# Patient Record
Sex: FEMALE | Race: WHITE | NOT HISPANIC OR LATINO | Employment: FULL TIME | URBAN - NONMETROPOLITAN AREA
[De-identification: names, ages, dates, MRNs, and addresses within clinical notes are randomized per-mention and may not be internally consistent; named-entity substitution may affect disease eponyms.]

---

## 2017-01-05 ENCOUNTER — ROUTINE PRENATAL (OUTPATIENT)
Dept: OBSTETRICS AND GYNECOLOGY | Facility: CLINIC | Age: 37
End: 2017-01-05

## 2017-01-05 VITALS — BODY MASS INDEX: 24.82 KG/M2 | DIASTOLIC BLOOD PRESSURE: 70 MMHG | WEIGHT: 173 LBS | SYSTOLIC BLOOD PRESSURE: 110 MMHG

## 2017-01-05 DIAGNOSIS — Z36.9 ENCOUNTER FOR ANTENATAL SCREENING: ICD-10-CM

## 2017-01-05 DIAGNOSIS — Z3A.28 28 WEEKS GESTATION OF PREGNANCY: ICD-10-CM

## 2017-01-05 DIAGNOSIS — O99.843 PREGNANCY COMPLICATED BY PREVIOUS GASTRIC BYPASS, ANTEPARTUM, THIRD TRIMESTER: Primary | ICD-10-CM

## 2017-01-05 PROBLEM — O99.840 PREGNANCY COMPLICATED BY PREVIOUS GASTRIC BYPASS, ANTEPARTUM: Status: ACTIVE | Noted: 2017-01-05

## 2017-01-05 PROCEDURE — 0502F SUBSEQUENT PRENATAL CARE: CPT | Performed by: OBSTETRICS & GYNECOLOGY

## 2017-01-05 RX ORDER — LANOLIN ALCOHOL/MO/W.PET/CERES
800 CREAM (GRAM) TOPICAL DAILY
COMMUNITY
End: 2017-04-03

## 2017-01-05 NOTE — PROGRESS NOTES
Chief Complaint   Patient presents with   • Routine Prenatal Visit       ROS  Vaginal bleeding: No   Nausea: No   Diarrhea: No   Constipation: No   Other:      Lab Results   Component Value Date    ABO A 08/08/2016    RH POS 08/08/2016       Specific topics discussed at today's visit: none - she had no major complaints,questions or concerns  Tests done today: GC/Chlamydia testing  GCT  and a vaginosis panel

## 2017-01-05 NOTE — MR AVS SNAPSHOT
Anayeli Ledesma Gina   2017 9:00 AM   Routine Prenatal    Dept Phone:  [phone]   Encounter #:  [id number]    Provider:  Mino Bansal MD   Department:  [facility]                Your Full Care Plan              Today's Medication Changes          These changes are accurate as of: 17  9:29 AM.  If you have any questions, ask your nurse or doctor.               Stop taking medication(s)listed here:     cetirizine 10 MG tablet   Commonly known as:  zyrTEC           dicyclomine 20 MG tablet   Commonly known as:  BENTYL           HYDROcodone-acetaminophen 7.5-325 MG per tablet   Commonly known as:  NORCO           montelukast 10 MG tablet   Commonly known as:  SINGULAIR           omeprazole 40 MG capsule   Commonly known as:  priLOSEC                      Your Updated Medication List          This list is accurate as of: 17  9:29 AM.  Always use your most recent med list.                ferrous sulfate 325 (65 FE) MG tablet       folic acid 400 MCG tablet   Commonly known as:  FOLVITE       MULTIVITAMIN PO       PROAIR  (90 BASE) MCG/ACT inhaler   Generic drug:  albuterol               We Performed the Following     CBC Auto Differential     Chlamydia / GC, DNA Probe     Glucose, Post 50 Gm Glucola     Vaginitis / Vaginosis DNA Probe       You Were Diagnosed With        Codes Comments    Encounter for  screening    -  Primary ICD-10-CM: Z36  ICD-9-CM: V28.9     Encounter for supervision of normal first pregnancy in third trimester     ICD-10-CM: Z34.03  ICD-9-CM: V22.0       Instructions     None    Patient Instructions History      Upcoming Appointments     Visit Type Date Time Department    OB FOLLOWUP 2017  9:00 AM [facility]    OB FOLLOWUP 2017 11:00 AM [facility]      MyChart Signup     Our records indicate that you have declined [facility]. If you would like to sign up for [facility], please  email [email or web address] or call [phone] to obtain an activation code.             Other Info from Your Visit           Your Appointments     2017 11:00 AM CST   OB FOLLOWUP with Mino Bansal MD   [facility] (--)    [address] [place] [address] [address]  [facility] [address] [place] [id number] [phone]              Allergies     Nsaids  Shortness Of Breath    Septra [Sulfamethoxazole-trimethoprim]      *childhood Rx    Zinacef [Cefuroxime In Sterile Water]      Childhood Rx      Vital Signs     Blood Pressure Weight Last Menstrual Period Body Mass Index Smoking Status       110/70 173 lb (78.5 kg) 2016 (Exact Date) 24.82 kg/m2 Never Smoker       Problems and Diagnoses Noted     Encounter for  screening    -  Primary    Prenatal care

## 2017-01-19 ENCOUNTER — ROUTINE PRENATAL (OUTPATIENT)
Dept: OBSTETRICS AND GYNECOLOGY | Facility: CLINIC | Age: 37
End: 2017-01-19

## 2017-01-19 VITALS — SYSTOLIC BLOOD PRESSURE: 118 MMHG | DIASTOLIC BLOOD PRESSURE: 72 MMHG | BODY MASS INDEX: 25.11 KG/M2 | WEIGHT: 175 LBS

## 2017-01-19 DIAGNOSIS — Z3A.30 30 WEEKS GESTATION OF PREGNANCY: ICD-10-CM

## 2017-01-19 DIAGNOSIS — O99.843 PREGNANCY COMPLICATED BY PREVIOUS GASTRIC BYPASS, ANTEPARTUM, THIRD TRIMESTER: Primary | ICD-10-CM

## 2017-01-19 PROCEDURE — 0502F SUBSEQUENT PRENATAL CARE: CPT | Performed by: OBSTETRICS & GYNECOLOGY

## 2017-01-19 NOTE — MR AVS SNAPSHOT
Anayeli Ledesma Gina   1/19/2017 9:15 AM   Routine Prenatal    Dept Phone:  [phone]   Encounter #:  [id number]    Provider:  Mino Bansal MD   Department:  [facility]                Your Full Care Plan              Your Updated Medication List          This list is accurate as of: 1/19/17 11:18 AM.  Always use your most recent med list.                ferrous sulfate 325 (65 FE) MG tablet       folic acid 400 MCG tablet   Commonly known as:  FOLVITE       MULTIVITAMIN PO       PROAIR  (90 BASE) MCG/ACT inhaler   Generic drug:  albuterol               Instructions     None    Patient Instructions History      Upcoming Appointments     Visit Type Date Time Department    OB FOLLOWUP 1/19/2017  9:15 AM [facility]    OB FOLLOWUP 2/3/2017  9:00 AM [facility]      MyChart Signup     Our records indicate that you have declined [facility]. If you would like to sign up for [facility], please email [email or web address] or call [phone] to obtain an activation code.             Other Info from Your Visit           Your Appointments     Feb 03, 2017  9:00 AM CST   OB FOLLOWUP with Mino Bansal MD   [facility] (--)    [address] [place] [address] Dr  [facility] [address] [place] [id number] [phone]              Allergies     Nsaids  Shortness Of Breath    Septra [Sulfamethoxazole-trimethoprim]      *childhood Rx    Zinacef [Cefuroxime In Sterile Water]      Childhood Rx      Reason for Visit     Routine Prenatal Visit           Vital Signs     Blood Pressure Weight Last Menstrual Period Body Mass Index Smoking Status       118/72 175 lb (79.4 kg) 06/20/2016 (Exact Date) 25.11 kg/m2 Never Smoker

## 2017-01-19 NOTE — PROGRESS NOTES
Chief Complaint   Patient presents with   • Routine Prenatal Visit       ROS  Headache: No   Visual changes: No   Swelling in legs: No   Nausea: No   Constipation: No   Diarrhea: No   Contractions: No   Leaking fluid: No   Vaginal bleeding: No   Other:      Specific topics discussed at today's visit: none - she had no major complaints,questions or concerns  Tests done today: none, her GCT was nl

## 2017-02-03 ENCOUNTER — ROUTINE PRENATAL (OUTPATIENT)
Dept: OBSTETRICS AND GYNECOLOGY | Facility: CLINIC | Age: 37
End: 2017-02-03

## 2017-02-03 VITALS — BODY MASS INDEX: 26.11 KG/M2 | SYSTOLIC BLOOD PRESSURE: 116 MMHG | DIASTOLIC BLOOD PRESSURE: 64 MMHG | WEIGHT: 182 LBS

## 2017-02-03 DIAGNOSIS — Z3A.32 32 WEEKS GESTATION OF PREGNANCY: ICD-10-CM

## 2017-02-03 DIAGNOSIS — O99.843 PREGNANCY COMPLICATED BY PREVIOUS GASTRIC BYPASS, ANTEPARTUM, THIRD TRIMESTER: Primary | ICD-10-CM

## 2017-02-03 PROCEDURE — 0502F SUBSEQUENT PRENATAL CARE: CPT | Performed by: OBSTETRICS & GYNECOLOGY

## 2017-02-16 ENCOUNTER — ROUTINE PRENATAL (OUTPATIENT)
Dept: OBSTETRICS AND GYNECOLOGY | Facility: CLINIC | Age: 37
End: 2017-02-16

## 2017-02-16 VITALS — DIASTOLIC BLOOD PRESSURE: 72 MMHG | SYSTOLIC BLOOD PRESSURE: 110 MMHG | WEIGHT: 183 LBS | BODY MASS INDEX: 26.26 KG/M2

## 2017-02-16 DIAGNOSIS — O99.843 PREGNANCY COMPLICATED BY PREVIOUS GASTRIC BYPASS, ANTEPARTUM, THIRD TRIMESTER: Primary | ICD-10-CM

## 2017-02-16 DIAGNOSIS — Z3A.34 34 WEEKS GESTATION OF PREGNANCY: ICD-10-CM

## 2017-02-16 PROCEDURE — 0502F SUBSEQUENT PRENATAL CARE: CPT | Performed by: OBSTETRICS & GYNECOLOGY

## 2017-02-16 NOTE — PROGRESS NOTES
Chief Complaint   Patient presents with   • Routine Prenatal Visit       No complaints    ROS  Headache: No   Visual changes: No   Swelling in legs: No   Nausea: No   Constipation: No   Diarrhea: No   Contractions: No   Leaking fluid: No   Vaginal bleeding: No   Other:      Specific topics discussed at today's visit: none - she had no major complaints,questions or concerns  Tests done today: none  Tests to be done at the next visit:GBS testing

## 2017-03-03 ENCOUNTER — ROUTINE PRENATAL (OUTPATIENT)
Dept: OBSTETRICS AND GYNECOLOGY | Facility: CLINIC | Age: 37
End: 2017-03-03

## 2017-03-03 VITALS — BODY MASS INDEX: 26.54 KG/M2 | SYSTOLIC BLOOD PRESSURE: 106 MMHG | WEIGHT: 185 LBS | DIASTOLIC BLOOD PRESSURE: 80 MMHG

## 2017-03-03 DIAGNOSIS — Z36.85 ANTENATAL SCREENING FOR STREPTOCOCCUS B: Primary | ICD-10-CM

## 2017-03-03 DIAGNOSIS — O99.843 PREGNANCY COMPLICATED BY PREVIOUS GASTRIC BYPASS, ANTEPARTUM, THIRD TRIMESTER: ICD-10-CM

## 2017-03-03 DIAGNOSIS — Z3A.36 36 WEEKS GESTATION OF PREGNANCY: ICD-10-CM

## 2017-03-03 PROCEDURE — 87653 STREP B DNA AMP PROBE: CPT | Performed by: OBSTETRICS & GYNECOLOGY

## 2017-03-03 PROCEDURE — 0502F SUBSEQUENT PRENATAL CARE: CPT | Performed by: OBSTETRICS & GYNECOLOGY

## 2017-03-03 NOTE — PROGRESS NOTES
Chief Complaint   Patient presents with   • Routine Prenatal Visit       No complaints    ROS  Headache: No   Visual changes: No   Swelling in legs: No   Nausea: No   Constipation: No   Diarrhea: No   Contractions: No   Leaking fluid: No   Vaginal bleeding: No   Other:      Specific topics discussed at today's visit: none - she had no major complaints,questions or concerns  Tests done today: GBS testing  Tests to be done at the next visit:none     Bedside ultrasound demonstrates the baby still in breech presentation

## 2017-03-04 LAB — GROUP B STREP, DNA: NEGATIVE

## 2017-03-10 ENCOUNTER — ROUTINE PRENATAL (OUTPATIENT)
Dept: OBSTETRICS AND GYNECOLOGY | Facility: CLINIC | Age: 37
End: 2017-03-10

## 2017-03-10 VITALS — SYSTOLIC BLOOD PRESSURE: 108 MMHG | WEIGHT: 186 LBS | DIASTOLIC BLOOD PRESSURE: 78 MMHG | BODY MASS INDEX: 26.69 KG/M2

## 2017-03-10 DIAGNOSIS — Z3A.37 37 WEEKS GESTATION OF PREGNANCY: ICD-10-CM

## 2017-03-10 DIAGNOSIS — O99.843 PREGNANCY COMPLICATED BY PREVIOUS GASTRIC BYPASS, ANTEPARTUM, THIRD TRIMESTER: ICD-10-CM

## 2017-03-10 PROCEDURE — 0502F SUBSEQUENT PRENATAL CARE: CPT | Performed by: OBSTETRICS & GYNECOLOGY

## 2017-03-10 NOTE — PROGRESS NOTES
Chief Complaint   Patient presents with   • Routine Prenatal Visit       No complaints    ROS  Headache: No   Visual changes: No   Swelling in legs: No   Nausea: No   Constipation: No   Diarrhea: No   Contractions: No   Leaking fluid: No   Vaginal bleeding: No   Other:      Specific topics discussed at today's visit: none - she had no major complaints,questions or concerns  Tests done today: none  Tests to be done at the next visit:none     Bedside ultrasound shows the fetus is still breech.  It does appear to be an adequate amount of fluid around the baby as well

## 2017-03-16 ENCOUNTER — ANESTHESIA (OUTPATIENT)
Dept: LABOR AND DELIVERY | Facility: HOSPITAL | Age: 37
End: 2017-03-16

## 2017-03-16 ENCOUNTER — ANESTHESIA EVENT (OUTPATIENT)
Dept: LABOR AND DELIVERY | Facility: HOSPITAL | Age: 37
End: 2017-03-16

## 2017-03-16 ENCOUNTER — HOSPITAL ENCOUNTER (INPATIENT)
Facility: HOSPITAL | Age: 37
LOS: 2 days | Discharge: HOME | End: 2017-03-18
Attending: OBSTETRICS & GYNECOLOGY | Admitting: OBSTETRICS & GYNECOLOGY

## 2017-03-16 ENCOUNTER — ROUTINE PRENATAL (OUTPATIENT)
Dept: OBSTETRICS AND GYNECOLOGY | Facility: CLINIC | Age: 37
End: 2017-03-16

## 2017-03-16 VITALS — BODY MASS INDEX: 26.98 KG/M2 | SYSTOLIC BLOOD PRESSURE: 90 MMHG | WEIGHT: 188 LBS | DIASTOLIC BLOOD PRESSURE: 70 MMHG

## 2017-03-16 DIAGNOSIS — O99.843 PREGNANCY COMPLICATED BY PREVIOUS GASTRIC BYPASS, ANTEPARTUM, THIRD TRIMESTER: ICD-10-CM

## 2017-03-16 DIAGNOSIS — Z3A.38 38 WEEKS GESTATION OF PREGNANCY: ICD-10-CM

## 2017-03-16 LAB
ABO GROUP BLD: NORMAL
AMPHET+METHAMPHET UR QL: NEGATIVE
BARBITURATES UR QL SCN: NEGATIVE
BENZODIAZ UR QL SCN: NEGATIVE
BLD GP AB SCN SERPL QL: NEGATIVE
CANNABINOIDS SERPL QL: NEGATIVE
COCAINE UR QL: NEGATIVE
DEPRECATED RDW RBC AUTO: 46.3 FL (ref 36.4–46.3)
ERYTHROCYTE [DISTWIDTH] IN BLOOD BY AUTOMATED COUNT: 17 % (ref 11.5–14.5)
HCT VFR BLD AUTO: 30.8 % (ref 35–45)
HGB BLD-MCNC: 9.3 G/DL (ref 12–15.5)
Lab: NORMAL
MCH RBC QN AUTO: 22.9 PG (ref 26.5–34)
MCHC RBC AUTO-ENTMCNC: 30.2 G/DL (ref 31.4–36)
MCV RBC AUTO: 75.7 FL (ref 80–98)
METHADONE UR QL SCN: NEGATIVE
OPIATES UR QL: NEGATIVE
OXYCODONE UR QL SCN: NEGATIVE
PLATELET # BLD AUTO: 371 10*3/MM3 (ref 150–450)
PMV BLD AUTO: 9.9 FL (ref 8–12)
RBC # BLD AUTO: 4.07 10*6/MM3 (ref 3.77–5.16)
RH BLD: POSITIVE
WBC NRBC COR # BLD: 11.02 10*3/MM3 (ref 3.2–9.8)

## 2017-03-16 PROCEDURE — 80307 DRUG TEST PRSMV CHEM ANLYZR: CPT | Performed by: OBSTETRICS & GYNECOLOGY

## 2017-03-16 PROCEDURE — 86900 BLOOD TYPING SEROLOGIC ABO: CPT | Performed by: OBSTETRICS & GYNECOLOGY

## 2017-03-16 PROCEDURE — 51703 INSERT BLADDER CATH COMPLEX: CPT

## 2017-03-16 PROCEDURE — 94799 UNLISTED PULMONARY SVC/PX: CPT

## 2017-03-16 PROCEDURE — 86901 BLOOD TYPING SEROLOGIC RH(D): CPT | Performed by: OBSTETRICS & GYNECOLOGY

## 2017-03-16 PROCEDURE — 59510 CESAREAN DELIVERY: CPT | Performed by: OBSTETRICS & GYNECOLOGY

## 2017-03-16 PROCEDURE — 25010000002 MORPHINE PER 10 MG: Performed by: NURSE ANESTHETIST, CERTIFIED REGISTERED

## 2017-03-16 PROCEDURE — 86850 RBC ANTIBODY SCREEN: CPT | Performed by: OBSTETRICS & GYNECOLOGY

## 2017-03-16 PROCEDURE — 59514 CESAREAN DELIVERY ONLY: CPT | Performed by: SPECIALIST/TECHNOLOGIST, OTHER

## 2017-03-16 PROCEDURE — 25010000002 PHENYLEPHRINE PER 1 ML: Performed by: NURSE ANESTHETIST, CERTIFIED REGISTERED

## 2017-03-16 PROCEDURE — 25010000002 GENTAMICIN PER 80 MG: Performed by: OBSTETRICS & GYNECOLOGY

## 2017-03-16 PROCEDURE — 25010000002 DIPHENHYDRAMINE PER 50 MG

## 2017-03-16 PROCEDURE — 85027 COMPLETE CBC AUTOMATED: CPT | Performed by: OBSTETRICS & GYNECOLOGY

## 2017-03-16 PROCEDURE — 25010000002 ONDANSETRON PER 1 MG: Performed by: NURSE ANESTHETIST, CERTIFIED REGISTERED

## 2017-03-16 RX ORDER — CARBOPROST TROMETHAMINE 250 UG/ML
250 INJECTION, SOLUTION INTRAMUSCULAR AS NEEDED
Status: DISCONTINUED | OUTPATIENT
Start: 2017-03-16 | End: 2017-03-16 | Stop reason: HOSPADM

## 2017-03-16 RX ORDER — ALBUTEROL SULFATE 90 UG/1
2 AEROSOL, METERED RESPIRATORY (INHALATION)
Status: DISCONTINUED | OUTPATIENT
Start: 2017-03-16 | End: 2017-03-16

## 2017-03-16 RX ORDER — LIDOCAINE HYDROCHLORIDE 10 MG/ML
5 INJECTION, SOLUTION INFILTRATION; PERINEURAL AS NEEDED
Status: DISCONTINUED | OUTPATIENT
Start: 2017-03-16 | End: 2017-03-16 | Stop reason: HOSPADM

## 2017-03-16 RX ORDER — FOLIC ACID 1 MG/1
1 TABLET ORAL DAILY
Status: DISCONTINUED | OUTPATIENT
Start: 2017-03-16 | End: 2017-03-16

## 2017-03-16 RX ORDER — MORPHINE SULFATE 1 MG/ML
INJECTION, SOLUTION EPIDURAL; INTRATHECAL; INTRAVENOUS AS NEEDED
Status: DISCONTINUED | OUTPATIENT
Start: 2017-03-16 | End: 2017-03-16 | Stop reason: SURG

## 2017-03-16 RX ORDER — OXYTOCIN/RINGER'S LACTATE 20/1000 ML
999 PLASTIC BAG, INJECTION (ML) INTRAVENOUS ONCE
Status: CANCELLED | OUTPATIENT
Start: 2017-03-16 | End: 2017-03-16

## 2017-03-16 RX ORDER — SODIUM CHLORIDE, SODIUM LACTATE, POTASSIUM CHLORIDE, CALCIUM CHLORIDE 600; 310; 30; 20 MG/100ML; MG/100ML; MG/100ML; MG/100ML
125 INJECTION, SOLUTION INTRAVENOUS CONTINUOUS
Status: DISCONTINUED | OUTPATIENT
Start: 2017-03-16 | End: 2017-03-18 | Stop reason: HOSPADM

## 2017-03-16 RX ORDER — OXYTOCIN/RINGER'S LACTATE 20/1000 ML
999 PLASTIC BAG, INJECTION (ML) INTRAVENOUS ONCE
Status: DISCONTINUED | OUTPATIENT
Start: 2017-03-16 | End: 2017-03-16 | Stop reason: HOSPADM

## 2017-03-16 RX ORDER — DIPHENHYDRAMINE HYDROCHLORIDE 50 MG/ML
25 INJECTION INTRAMUSCULAR; INTRAVENOUS EVERY 4 HOURS PRN
Status: DISCONTINUED | OUTPATIENT
Start: 2017-03-16 | End: 2017-03-18 | Stop reason: HOSPADM

## 2017-03-16 RX ORDER — SODIUM CHLORIDE 0.9 % (FLUSH) 0.9 %
1-10 SYRINGE (ML) INJECTION AS NEEDED
Status: CANCELLED | OUTPATIENT
Start: 2017-03-16

## 2017-03-16 RX ORDER — OXYCODONE AND ACETAMINOPHEN 10; 325 MG/1; MG/1
1 TABLET ORAL EVERY 4 HOURS PRN
Status: DISCONTINUED | OUTPATIENT
Start: 2017-03-17 | End: 2017-03-18 | Stop reason: HOSPADM

## 2017-03-16 RX ORDER — METHYLERGONOVINE MALEATE 0.2 MG/ML
200 INJECTION INTRAVENOUS AS NEEDED
Status: DISCONTINUED | OUTPATIENT
Start: 2017-03-16 | End: 2017-03-16 | Stop reason: HOSPADM

## 2017-03-16 RX ORDER — CLINDAMYCIN PHOSPHATE 900 MG/50ML
900 INJECTION INTRAVENOUS ONCE
Status: COMPLETED | OUTPATIENT
Start: 2017-03-16 | End: 2017-03-16

## 2017-03-16 RX ORDER — DOCUSATE SODIUM 100 MG/1
100 CAPSULE, LIQUID FILLED ORAL 2 TIMES DAILY PRN
Status: DISCONTINUED | OUTPATIENT
Start: 2017-03-16 | End: 2017-03-18 | Stop reason: HOSPADM

## 2017-03-16 RX ORDER — DIPHENHYDRAMINE HYDROCHLORIDE 50 MG/ML
INJECTION INTRAMUSCULAR; INTRAVENOUS
Status: COMPLETED
Start: 2017-03-16 | End: 2017-03-16

## 2017-03-16 RX ORDER — ONDANSETRON 2 MG/ML
INJECTION INTRAMUSCULAR; INTRAVENOUS AS NEEDED
Status: DISCONTINUED | OUTPATIENT
Start: 2017-03-16 | End: 2017-03-16 | Stop reason: SURG

## 2017-03-16 RX ORDER — HYDROXYZINE HYDROCHLORIDE 25 MG/1
50 TABLET, FILM COATED ORAL EVERY 6 HOURS PRN
Status: DISCONTINUED | OUTPATIENT
Start: 2017-03-16 | End: 2017-03-18 | Stop reason: HOSPADM

## 2017-03-16 RX ORDER — SODIUM CHLORIDE 0.9 % (FLUSH) 0.9 %
1-10 SYRINGE (ML) INJECTION AS NEEDED
Status: DISCONTINUED | OUTPATIENT
Start: 2017-03-16 | End: 2017-03-16 | Stop reason: HOSPADM

## 2017-03-16 RX ORDER — PRENATAL VIT/IRON FUM/FOLIC AC 27MG-0.8MG
1 TABLET ORAL DAILY
Status: DISCONTINUED | OUTPATIENT
Start: 2017-03-16 | End: 2017-03-18 | Stop reason: HOSPADM

## 2017-03-16 RX ORDER — ALBUTEROL SULFATE 90 UG/1
2 AEROSOL, METERED RESPIRATORY (INHALATION) EVERY 4 HOURS PRN
Status: DISCONTINUED | OUTPATIENT
Start: 2017-03-16 | End: 2017-03-18 | Stop reason: HOSPADM

## 2017-03-16 RX ORDER — CALCIUM CARBONATE 200(500)MG
2 TABLET,CHEWABLE ORAL EVERY 6 HOURS PRN
Status: DISCONTINUED | OUTPATIENT
Start: 2017-03-16 | End: 2017-03-18 | Stop reason: HOSPADM

## 2017-03-16 RX ORDER — DIPHENHYDRAMINE HCL 25 MG
25 CAPSULE ORAL EVERY 4 HOURS PRN
Status: DISCONTINUED | OUTPATIENT
Start: 2017-03-16 | End: 2017-03-18 | Stop reason: HOSPADM

## 2017-03-16 RX ORDER — SODIUM CHLORIDE, SODIUM LACTATE, POTASSIUM CHLORIDE, CALCIUM CHLORIDE 600; 310; 30; 20 MG/100ML; MG/100ML; MG/100ML; MG/100ML
INJECTION, SOLUTION INTRAVENOUS
Status: COMPLETED
Start: 2017-03-16 | End: 2017-03-16

## 2017-03-16 RX ORDER — OXYTOCIN/RINGER'S LACTATE 20/1000 ML
125 PLASTIC BAG, INJECTION (ML) INTRAVENOUS AS NEEDED
Status: CANCELLED | OUTPATIENT
Start: 2017-03-16 | End: 2017-03-17

## 2017-03-16 RX ORDER — ACETAMINOPHEN 325 MG/1
650 TABLET ORAL EVERY 4 HOURS PRN
Status: ACTIVE | OUTPATIENT
Start: 2017-03-16 | End: 2017-03-17

## 2017-03-16 RX ORDER — ONDANSETRON 4 MG/1
4 TABLET, FILM COATED ORAL EVERY 8 HOURS PRN
Status: DISCONTINUED | OUTPATIENT
Start: 2017-03-16 | End: 2017-03-18 | Stop reason: HOSPADM

## 2017-03-16 RX ORDER — LANOLIN 100 %
1 OINTMENT (GRAM) TOPICAL
Status: DISCONTINUED | OUTPATIENT
Start: 2017-03-16 | End: 2017-03-18 | Stop reason: HOSPADM

## 2017-03-16 RX ORDER — OXYTOCIN/RINGER'S LACTATE 20/1000 ML
PLASTIC BAG, INJECTION (ML) INTRAVENOUS
Status: COMPLETED
Start: 2017-03-16 | End: 2017-03-16

## 2017-03-16 RX ORDER — BUPIVACAINE HYDROCHLORIDE 7.5 MG/ML
INJECTION, SOLUTION EPIDURAL; RETROBULBAR AS NEEDED
Status: DISCONTINUED | OUTPATIENT
Start: 2017-03-16 | End: 2017-03-16 | Stop reason: SURG

## 2017-03-16 RX ORDER — OXYCODONE HYDROCHLORIDE AND ACETAMINOPHEN 5; 325 MG/1; MG/1
1 TABLET ORAL EVERY 4 HOURS PRN
Status: DISCONTINUED | OUTPATIENT
Start: 2017-03-17 | End: 2017-03-18 | Stop reason: HOSPADM

## 2017-03-16 RX ORDER — NALOXONE HCL 0.4 MG/ML
0.2 VIAL (ML) INJECTION
Status: DISCONTINUED | OUTPATIENT
Start: 2017-03-16 | End: 2017-03-18 | Stop reason: HOSPADM

## 2017-03-16 RX ORDER — LIDOCAINE HYDROCHLORIDE 10 MG/ML
5 INJECTION, SOLUTION INFILTRATION; PERINEURAL AS NEEDED
Status: CANCELLED | OUTPATIENT
Start: 2017-03-16

## 2017-03-16 RX ORDER — PROMETHAZINE HYDROCHLORIDE 25 MG/ML
12.5 INJECTION, SOLUTION INTRAMUSCULAR; INTRAVENOUS EVERY 6 HOURS PRN
Status: DISCONTINUED | OUTPATIENT
Start: 2017-03-16 | End: 2017-03-18 | Stop reason: HOSPADM

## 2017-03-16 RX ORDER — OXYTOCIN/RINGER'S LACTATE 20/1000 ML
1000 PLASTIC BAG, INJECTION (ML) INTRAVENOUS CONTINUOUS
Status: ACTIVE | OUTPATIENT
Start: 2017-03-16 | End: 2017-03-16

## 2017-03-16 RX ORDER — MISOPROSTOL 100 UG/1
800 TABLET ORAL AS NEEDED
Status: CANCELLED | OUTPATIENT
Start: 2017-03-16

## 2017-03-16 RX ORDER — LANOLIN 100 %
OINTMENT (GRAM) TOPICAL
Status: COMPLETED
Start: 2017-03-16 | End: 2017-03-16

## 2017-03-16 RX ORDER — SODIUM CHLORIDE, SODIUM LACTATE, POTASSIUM CHLORIDE, CALCIUM CHLORIDE 600; 310; 30; 20 MG/100ML; MG/100ML; MG/100ML; MG/100ML
125 INJECTION, SOLUTION INTRAVENOUS CONTINUOUS
Status: CANCELLED | OUTPATIENT
Start: 2017-03-16

## 2017-03-16 RX ORDER — MISOPROSTOL 200 UG/1
800 TABLET ORAL AS NEEDED
Status: DISCONTINUED | OUTPATIENT
Start: 2017-03-16 | End: 2017-03-16 | Stop reason: HOSPADM

## 2017-03-16 RX ORDER — METHYLERGONOVINE MALEATE 0.2 MG/ML
200 INJECTION INTRAVENOUS AS NEEDED
Status: CANCELLED | OUTPATIENT
Start: 2017-03-16

## 2017-03-16 RX ORDER — SODIUM CHLORIDE, SODIUM LACTATE, POTASSIUM CHLORIDE, CALCIUM CHLORIDE 600; 310; 30; 20 MG/100ML; MG/100ML; MG/100ML; MG/100ML
INJECTION, SOLUTION INTRAVENOUS
Status: DISPENSED
Start: 2017-03-16 | End: 2017-03-16

## 2017-03-16 RX ORDER — ONDANSETRON 2 MG/ML
4 INJECTION INTRAMUSCULAR; INTRAVENOUS ONCE AS NEEDED
Status: ACTIVE | OUTPATIENT
Start: 2017-03-16 | End: 2017-03-17

## 2017-03-16 RX ORDER — ZOLPIDEM TARTRATE 5 MG/1
5 TABLET ORAL NIGHTLY PRN
Status: DISCONTINUED | OUTPATIENT
Start: 2017-03-16 | End: 2017-03-18 | Stop reason: HOSPADM

## 2017-03-16 RX ORDER — CARBOPROST TROMETHAMINE 250 UG/ML
250 INJECTION, SOLUTION INTRAMUSCULAR AS NEEDED
Status: CANCELLED | OUTPATIENT
Start: 2017-03-16

## 2017-03-16 RX ORDER — LANOLIN ALCOHOL/MO/W.PET/CERES
800 CREAM (GRAM) TOPICAL DAILY
Status: DISCONTINUED | OUTPATIENT
Start: 2017-03-16 | End: 2017-03-16

## 2017-03-16 RX ORDER — OXYTOCIN/RINGER'S LACTATE 20/1000 ML
125 PLASTIC BAG, INJECTION (ML) INTRAVENOUS AS NEEDED
Status: DISCONTINUED | OUTPATIENT
Start: 2017-03-16 | End: 2017-03-16 | Stop reason: HOSPADM

## 2017-03-16 RX ADMIN — SODIUM CHLORIDE, POTASSIUM CHLORIDE, SODIUM LACTATE AND CALCIUM CHLORIDE 1000 ML: 600; 310; 30; 20 INJECTION, SOLUTION INTRAVENOUS at 11:47

## 2017-03-16 RX ADMIN — Medication 200 ML: at 12:50

## 2017-03-16 RX ADMIN — PHENYLEPHRINE HYDROCHLORIDE 100 MCG: 10 INJECTION INTRAVENOUS at 12:29

## 2017-03-16 RX ADMIN — MORPHINE SULFATE 0.2 MG: 1 INJECTION, SOLUTION EPIDURAL; INTRATHECAL; INTRAVENOUS at 12:21

## 2017-03-16 RX ADMIN — SODIUM CHLORIDE, POTASSIUM CHLORIDE, SODIUM LACTATE AND CALCIUM CHLORIDE: 600; 310; 30; 20 INJECTION, SOLUTION INTRAVENOUS at 12:25

## 2017-03-16 RX ADMIN — SODIUM CHLORIDE, SODIUM LACTATE, POTASSIUM CHLORIDE, AND CALCIUM CHLORIDE 300 ML: .6; .31; .03; .02 INJECTION, SOLUTION INTRAVENOUS at 12:25

## 2017-03-16 RX ADMIN — Medication: at 15:00

## 2017-03-16 RX ADMIN — Medication 125 ML/HR: at 14:34

## 2017-03-16 RX ADMIN — Medication 400 ML: at 13:05

## 2017-03-16 RX ADMIN — GENTAMICIN SULFATE 120 MG: 40 INJECTION, SOLUTION INTRAMUSCULAR; INTRAVENOUS at 12:29

## 2017-03-16 RX ADMIN — SODIUM CHLORIDE, SODIUM LACTATE, POTASSIUM CHLORIDE, AND CALCIUM CHLORIDE 1000 ML: .6; .31; .03; .02 INJECTION, SOLUTION INTRAVENOUS at 11:47

## 2017-03-16 RX ADMIN — CLINDAMYCIN IN 5 PERCENT DEXTROSE 900 MG: 18 INJECTION, SOLUTION INTRAVENOUS at 12:21

## 2017-03-16 RX ADMIN — Medication 100 ML: at 12:39

## 2017-03-16 RX ADMIN — SODIUM CITRATE AND CITRIC ACID MONOHYDRATE 30 ML: 500; 334 SOLUTION ORAL at 12:00

## 2017-03-16 RX ADMIN — ONDANSETRON 8 MG: 2 INJECTION INTRAMUSCULAR; INTRAVENOUS at 12:10

## 2017-03-16 RX ADMIN — BUPIVACAINE HYDROCHLORIDE 2 ML: 7.5 INJECTION, SOLUTION EPIDURAL; RETROBULBAR at 12:21

## 2017-03-16 RX ADMIN — SODIUM CHLORIDE, SODIUM LACTATE, POTASSIUM CHLORIDE, AND CALCIUM CHLORIDE 700 ML: .6; .31; .03; .02 INJECTION, SOLUTION INTRAVENOUS at 12:14

## 2017-03-16 RX ADMIN — DIPHENHYDRAMINE HYDROCHLORIDE 25 MG: 50 INJECTION INTRAMUSCULAR; INTRAVENOUS at 15:46

## 2017-03-16 NOTE — ANESTHESIA PROCEDURE NOTES
Spinal Block    Patient location during procedure: OB  Indication:at surgeon's request and procedure for pain  Performed By  CRNA: AKANKSHA SAUNDERS  Preanesthetic Checklist  Completed: patient identified, site marked, surgical consent, pre-op evaluation, timeout performed, IV checked, risks and benefits discussed and monitors and equipment checked  Spinal Block Prep:  Patient Position:sitting  Sterile Tech:cap, gloves, mask and sterile barriers  Prep:DuraPrep  Patient Monitoring:blood pressure monitoring, continuous pulse oximetry and EKG  Spinal Block Procedure  Approach:midline  Guidance:landmark technique  Location:L3-L4  Needle Type:Sprotte  Needle Gauge:24 G  Placement of Spinal needle event:cerebrospinal fluid aspirated    Fluid Appearance:clear  Post Assessment  Patient Tolerance:patient tolerated the procedure well with no apparent complications  Complications no

## 2017-03-16 NOTE — ANESTHESIA PREPROCEDURE EVALUATION
Anesthesia Evaluation     Patient summary reviewed and Nursing notes reviewed   NPO Status: > 2 hours   Airway   Mallampati: I  TM distance: >3 FB  Neck ROM: full  no difficulty expected  Dental - normal exam     Pulmonary - normal exam   (+) asthma (as a child, ),   Cardiovascular - negative cardio ROS and normal exam        Neuro/Psych- negative ROS  GI/Hepatic/Renal/Endo    (+)  chronic renal disease stones,     Musculoskeletal (-) negative ROS    Abdominal    Substance History - negative use     OB/GYN    (+) Pregnant,     Comment: Breech presentation      Other - negative ROS                                   Anesthesia Plan    ASA 2 - emergent     spinal     intravenous induction   Anesthetic plan and risks discussed with patient.

## 2017-03-16 NOTE — BRIEF OP NOTE
SECTION PRIMARY  Procedure Note    Anayeli Fuentes  3/16/2017    Pre-op Diagnosis:   IUP at 38+3  Breech  Advanced cervical dilation     Post-op Diagnosis:     same    Procedure/CPT® Codes:      Procedure(s):   SECTION PRIMARY    Surgeon(s):  Mino Bansal MD    Anesthesia: Spinal    Staff:   Circulator: Maryanne Og RN  Scrub Person: Anegla Kumari  L & YESENIA Nurse: Elida Woods RN    Estimated Blood Loss: 500 mL  Urine Voided: 100 mL    Specimens:                * No specimens in log *      Drains:           Findings: nl appearing female, apgars 8+9     Complications: none      Mino Bansal MD     Date: 3/16/2017  Time: 12:58 PM

## 2017-03-16 NOTE — ANESTHESIA POSTPROCEDURE EVALUATION
Patient: Anayeli Fuentes    Procedure Summary     Date Anesthesia Start Anesthesia Stop Room / Location    17 1213 1322 [facility] LABOR DELIVERY       Procedure Diagnosis Surgeon Provider     SECTION PRIMARY (N/A Abdomen) No diagnosis on file. MD Lanette Singh CRNA          Anesthesia Type: spinal  Last vitals  BP      Temp      Pulse     Resp      SpO2        Post Anesthesia Care and Evaluation    Patient location during evaluation: bedside  Patient participation: complete - patient participated  Level of consciousness: awake and awake and alert  Pain score: 0  Pain management: satisfactory to patient  Airway patency: patent  Anesthetic complications: No anesthetic complications  PONV Status: none  Cardiovascular status: acceptable and stable  Respiratory status: acceptable, room air, unassisted and spontaneous ventilation  Hydration status: acceptable  Post Neuraxial Block status: Motor and sensory function returned to baseline, No signs or symptoms of PDPH and Spinal starting to receed

## 2017-03-16 NOTE — PLAN OF CARE
Problem: Patient Care Overview (Adult)  Goal: Plan of Care Review  Outcome: Ongoing (interventions implemented as appropriate)    17 4340   Coping/Psychosocial Response Interventions   Plan Of Care Reviewed With patient;spouse   Patient Care Overview   Progress improving   Outcome Evaluation   Outcome Summary/Follow up Plan Pt had duramorph, pain is well controlled, breastfeeding without difficulty, stood at bedside and tolerated well, no problems noted.       Goal: Adult Individualization and Mutuality  Outcome: Ongoing (interventions implemented as appropriate)  Goal: Discharge Needs Assessment  Outcome: Outcome(s) achieved Date Met:  17    Problem: Anesthesia/Analgesia, Neuraxial (Obstetrics)  Goal: Signs and Symptoms of Listed Potential Problems Will be Absent or Manageable (Anesthesia/Analgesia, Neuraxial)  Outcome: Ongoing (interventions implemented as appropriate)    Problem: Fall Risk  (Adult,Obstetrics,Pediatric)  Goal: Identify Related Risk Factors and Signs and Symptoms  Outcome: Ongoing (interventions implemented as appropriate)  Goal: Absence of Maternal Fall  Outcome: Ongoing (interventions implemented as appropriate)  Goal: Absence of Protivin Fall/Drop  Outcome: Ongoing (interventions implemented as appropriate)    Problem: Postpartum ( Delivery) (Adult)  Goal: Signs and Symptoms of Listed Potential Problems Will be Absent or Manageable (Postpartum)  Outcome: Ongoing (interventions implemented as appropriate)    Problem: Breastfeeding (Adult,NICU,,Obstetrics,Pediatric)  Goal: Signs and Symptoms of Listed Potential Problems Will be Absent or Manageable (Breastfeeding)  Outcome: Ongoing (interventions implemented as appropriate)

## 2017-03-16 NOTE — H&P
Anayeli Fuentes  : 1980  MRN: [id number]  CSN: [id number]    History and Physical    Anayeli Fuentes is a 36 y.o. year old  with an Estimated Date of Delivery: 3/27/17 presenting for  delivery due to breech presentation and 4 cm dilated.  She has a known posterior placenta.  She is not planning for sterilization at the time of the .    Her prenatal care has been benign.    Obstetric History       T0      TAB0   SAB0   E0   M0   L0       # Outcome Date GA Lbr Luciano/2nd Weight Sex Delivery Anes PTL Lv   1 Current                 Past Medical History   Diagnosis Date   • Abdominal pain    • Abnormal Pap smear of cervix    • Asthma    • Cervical dysplasia    • Chronic abdominal pain      nonspecific   • Chronic cholecystitis with calculus    • Diabetes mellitus screening    • Encounter for removal of intrauterine contraceptive device    • Iron deficiency anemia    • Kidney stone    • Nausea    • Pelvic congestion syndrome    • Right upper quadrant pain    • Screening for deficiency anemia    • Screening for hyperlipidemia    • Thyroid disorder screening      Past Surgical History   Procedure Laterality Date   • Cholecystectomy with intraoperative cholangiogram  2014   • Gastric banding     • Gastric bypass     • Gastric banding         Current Outpatient Prescriptions:   •  albuterol (PROAIR HFA) 108 (90 BASE) MCG/ACT inhaler, Inhale 2 puffs., Disp: , Rfl:   •  Fe Heme Polypeptide-Folic Acid (PROFERRIN-FORTE) 12-1 MG tablet, Take 1 tablet by mouth 3 (Three) Times a Day., Disp: 90 each, Rfl: 10  •  folic acid (FOLVITE) 400 MCG tablet, Take 800 mcg by mouth Daily., Disp: , Rfl:   •  Multiple Vitamins-Minerals (MULTIVITAMIN PO), Take 1 tablet by mouth daily., Disp: , Rfl:     Allergies   Allergen Reactions   • Nsaids Shortness Of Breath   • Septra [Sulfamethoxazole-Trimethoprim]      *childhood Rx   • Zinacef [Cefuroxime In Sterile Water]      Childhood  Rx     Smoking status: Never Smoker                                                              Smokeless status: Never Used                        Review of Systems    Visit Vitals   • BP 90/70   • Wt 188 lb (85.3 kg)   • LMP 2016 (Exact Date)   • BMI 26.98 kg/m2     General: well developed; well nourished  no acute distress   Heart: regular rate and rhythm   Lungs: breathing is unlabored  clear to auscultation bilaterally   Abdomen: soft, non-tender; no masses  no umbilical or inginual hernias are present  no hepato-splenomegaly  Gravid, cervix is 4 cm 80% effaced, and footling     Prenatal Labs  Lab Results   Component Value Date    HEPBSAG Negative 2016    ABO A 2016    RH POS 2016       Recent Labs  Lab Results   Component Value Date    HGB 9.7 (L) 2017    HCT 31.6 (L) 2017    WBC 10.8 (H) 2017     2017        Assessment   1. IUP with an Estimated Date of Delivery: 3/27/17  2. Planned  section due to malpresentation: Footling, and early labor  3.      Plan   Primary  The risks, benefits. and alternatives to  section were discussed.  The risks that were discussed included, but were not limited to, pain, infection, bleeding, hemorrhage; including need for transfusion to which she would have no objection; injury to the bowel, bladder, uterus, tubes, ovaries, or baby which could require further surgery or prolonged hospitalization.  The patient understands that we'll have leg pumps on her legs to try and reduce the risk of clot formation her legs.  She understands that we will have early ambulation to also reduce the risk of blood clot formation and to reduce the risk of pneumonia.  She will be given antibiotics prior to her surgery to help decrease the risk for infection.  All questions were answered.          This document has been electronically signed by Mino Bansal MD on 2017 10:34 AM

## 2017-03-16 NOTE — PLAN OF CARE
Problem: Patient Care Overview (Adult)  Goal: Plan of Care Review  Outcome: Ongoing (interventions implemented as appropriate)    17 1715   Coping/Psychosocial Response Interventions   Plan Of Care Reviewed With patient;spouse;family   Patient Care Overview   Progress improving       Goal: Adult Individualization and Mutuality  Outcome: Ongoing (interventions implemented as appropriate)  Goal: Discharge Needs Assessment  Outcome: Ongoing (interventions implemented as appropriate)    Problem:  Delivery (Adult,Obstetrics,Pediatric)  Goal: Signs and Symptoms of Listed Potential Problems Will be Absent or Manageable ( Delivery)  Outcome: Ongoing (interventions implemented as appropriate)    Problem: Anesthesia/Analgesia, Neuraxial (Obstetrics)  Goal: Signs and Symptoms of Listed Potential Problems Will be Absent or Manageable (Anesthesia/Analgesia, Neuraxial)  Outcome: Ongoing (interventions implemented as appropriate)    Problem: Fall Risk  (Adult,Obstetrics,Pediatric)  Goal: Identify Related Risk Factors and Signs and Symptoms  Outcome: Ongoing (interventions implemented as appropriate)  Goal: Absence of Maternal Fall  Outcome: Ongoing (interventions implemented as appropriate)  Goal: Absence of  Fall/Drop  Outcome: Ongoing (interventions implemented as appropriate)

## 2017-03-17 LAB
ANISOCYTOSIS BLD QL: NORMAL
BASOPHILS # BLD AUTO: 0.06 10*3/MM3 (ref 0–0.2)
BASOPHILS NFR BLD AUTO: 0.4 % (ref 0–2)
DEPRECATED RDW RBC AUTO: 46.8 FL (ref 36.4–46.3)
EOSINOPHIL # BLD AUTO: 0.15 10*3/MM3 (ref 0–0.7)
EOSINOPHIL NFR BLD AUTO: 1 % (ref 0–7)
ERYTHROCYTE [DISTWIDTH] IN BLOOD BY AUTOMATED COUNT: 17 % (ref 11.5–14.5)
HCT VFR BLD AUTO: 24.6 % (ref 35–45)
HGB BLD-MCNC: 7.5 G/DL (ref 12–15.5)
HYPOCHROMIA BLD QL: NORMAL
IMM GRANULOCYTES # BLD: 0.04 10*3/MM3 (ref 0–0.02)
IMM GRANULOCYTES NFR BLD: 0.3 % (ref 0–0.5)
LYMPHOCYTES # BLD AUTO: 2.79 10*3/MM3 (ref 0.6–4.2)
LYMPHOCYTES NFR BLD AUTO: 19.3 % (ref 10–50)
MCH RBC QN AUTO: 23.1 PG (ref 26.5–34)
MCHC RBC AUTO-ENTMCNC: 30.5 G/DL (ref 31.4–36)
MCV RBC AUTO: 75.9 FL (ref 80–98)
MICROCYTES BLD QL: NORMAL
MONOCYTES # BLD AUTO: 1.01 10*3/MM3 (ref 0–0.9)
MONOCYTES NFR BLD AUTO: 7 % (ref 0–12)
NEUTROPHILS # BLD AUTO: 10.42 10*3/MM3 (ref 2–8.6)
NEUTROPHILS NFR BLD AUTO: 72 % (ref 37–80)
PLATELET # BLD AUTO: 311 10*3/MM3 (ref 150–450)
PMV BLD AUTO: 10.4 FL (ref 8–12)
RBC # BLD AUTO: 3.24 10*6/MM3 (ref 3.77–5.16)
SMALL PLATELETS BLD QL SMEAR: ADEQUATE
WBC MORPH BLD: NORMAL
WBC NRBC COR # BLD: 14.47 10*3/MM3 (ref 3.2–9.8)

## 2017-03-17 PROCEDURE — 85007 BL SMEAR W/DIFF WBC COUNT: CPT | Performed by: OBSTETRICS & GYNECOLOGY

## 2017-03-17 PROCEDURE — 85025 COMPLETE CBC W/AUTO DIFF WBC: CPT | Performed by: OBSTETRICS & GYNECOLOGY

## 2017-03-17 RX ORDER — FERROUS SULFATE TAB EC 324 MG (65 MG FE EQUIVALENT) 324 (65 FE) MG
324 TABLET DELAYED RESPONSE ORAL 3 TIMES DAILY
Status: DISCONTINUED | OUTPATIENT
Start: 2017-03-17 | End: 2017-03-18 | Stop reason: HOSPADM

## 2017-03-17 RX ORDER — SIMETHICONE 80 MG
80 TABLET,CHEWABLE ORAL 4 TIMES DAILY PRN
Status: DISCONTINUED | OUTPATIENT
Start: 2017-03-17 | End: 2017-03-18 | Stop reason: HOSPADM

## 2017-03-17 RX ORDER — FOLIC ACID 1 MG/1
1 TABLET ORAL 3 TIMES DAILY
Status: DISCONTINUED | OUTPATIENT
Start: 2017-03-17 | End: 2017-03-18 | Stop reason: HOSPADM

## 2017-03-17 RX ADMIN — OXYCODONE HYDROCHLORIDE AND ACETAMINOPHEN 1 TABLET: 5; 325 TABLET ORAL at 12:09

## 2017-03-17 RX ADMIN — FERROUS SULFATE TAB EC 324 MG (65 MG FE EQUIVALENT) 324 MG: 324 (65 FE) TABLET DELAYED RESPONSE at 20:08

## 2017-03-17 RX ADMIN — FERROUS SULFATE TAB EC 324 MG (65 MG FE EQUIVALENT) 324 MG: 324 (65 FE) TABLET DELAYED RESPONSE at 17:23

## 2017-03-17 RX ADMIN — FOLIC ACID 1 MG: 1 TABLET ORAL at 17:23

## 2017-03-17 RX ADMIN — SIMETHICONE CHEW TAB 80 MG 80 MG: 80 TABLET ORAL at 17:24

## 2017-03-17 RX ADMIN — OXYCODONE HYDROCHLORIDE AND ACETAMINOPHEN 1 TABLET: 5; 325 TABLET ORAL at 16:40

## 2017-03-17 RX ADMIN — OXYCODONE HYDROCHLORIDE AND ACETAMINOPHEN 1 TABLET: 5; 325 TABLET ORAL at 07:25

## 2017-03-17 RX ADMIN — PRENATAL VIT W/ FE FUMARATE-FA TAB 27-0.8 MG 1 TABLET: 27-0.8 TAB at 08:51

## 2017-03-17 RX ADMIN — FOLIC ACID 1 MG: 1 TABLET ORAL at 08:51

## 2017-03-17 RX ADMIN — MEASLES, MUMPS, AND RUBELLA VIRUS VACCINE LIVE 0.5 ML: 1000; 12500; 1000 INJECTION, POWDER, LYOPHILIZED, FOR SUSPENSION SUBCUTANEOUS at 20:09

## 2017-03-17 RX ADMIN — OXYCODONE HYDROCHLORIDE AND ACETAMINOPHEN 1 TABLET: 5; 325 TABLET ORAL at 03:09

## 2017-03-17 RX ADMIN — OXYCODONE HYDROCHLORIDE AND ACETAMINOPHEN 1 TABLET: 5; 325 TABLET ORAL at 22:20

## 2017-03-17 RX ADMIN — FERROUS SULFATE TAB EC 324 MG (65 MG FE EQUIVALENT) 324 MG: 324 (65 FE) TABLET DELAYED RESPONSE at 08:51

## 2017-03-17 RX ADMIN — FOLIC ACID 1 MG: 1 TABLET ORAL at 20:08

## 2017-03-17 NOTE — PLAN OF CARE
Problem: Breastfeeding (Adult,NICU,[place],Obstetrics,Pediatric)  Goal: Signs and Symptoms of Listed Potential Problems Will be Absent or Manageable (Breastfeeding)  Outcome: Ongoing (interventions implemented as appropriate)

## 2017-03-17 NOTE — PLAN OF CARE
Problem: Patient Care Overview (Adult)  Goal: Plan of Care Review  Outcome: Ongoing (interventions implemented as appropriate)    17 4128   Coping/Psychosocial Response Interventions   Plan Of Care Reviewed With patient   Patient Care Overview   Progress improving   Outcome Evaluation   Outcome Summary/Follow up Plan Pain controlled with prn percocet, ambulating well in halls, voiding, vitals stable, lochia is scant, denies any issues.       Goal: Adult Individualization and Mutuality  Outcome: Ongoing (interventions implemented as appropriate)    Problem: Postpartum ( Delivery) (Adult)  Goal: Signs and Symptoms of Listed Potential Problems Will be Absent or Manageable (Postpartum)  Outcome: Ongoing (interventions implemented as appropriate)    Problem: Breastfeeding (Adult,NICU,[place],Obstetrics,Pediatric)  Goal: Signs and Symptoms of Listed Potential Problems Will be Absent or Manageable (Breastfeeding)  Outcome: Ongoing (interventions implemented as appropriate)

## 2017-03-17 NOTE — PROGRESS NOTES
[facility]  Anayeli Fuentes  : 1980  MRN: [id number]  CSN: [id number]    Postpartum Day #1  Subjective   Anayeli Fuentes was seen and examined this morning in NAD. Pt states pain is well controlled on current medications. (+)Ambulation, [place] WNL, (-)BM and (-)Flatus. Pt is breast feeding. Patient is currently undecided on birth control option.     Objective     Min/max vitals past 24 hours:   Temp  Min: 97.2 °F (36.2 °C)  Max: 99 °F (37.2 °C)  BP  Min: 90/70  Max: 137/73  Pulse  Min: 57  Max: 82  Pulse  Min: 57  Max: 82                        Gen: A&Ox3, NAD          HEENT: NC/AT, PERRL, EOMI, no facial asymetry          CV: RRR, S1 S2 apreciated, no murmurs, rubs, gallops          Respiratory: CTAB, bilateral symetrical chest rise.  Abdomen: soft, not tender; normal bowel sounds; no masses, Fundus firm and below the umbilicus, Incision C/D/I   Pelvic: Deferred         Extremities: No edema, No erythema    Lab Results   Component Value Date    WBC 11.02 (H) 2017    HGB 9.3 (L) 2017    HCT 30.8 (L) 2017    MCV 75.7 (L) 2017     2017    RH Positive 2017    HEPBSAG Negative 2016        Assessment  Anayeli Fuentes is a 36 y.o. year old  s/p Primary Low  Transverse CS secondary to breech presentation day 1  1. Postpartum Day 1 S/P Primary Low  Transverse CS     Plan   1. Continue routine post op care - encourage ambulation  2. Pt is breast feeding  3. Patient is currently undecided on birth control option.             This document has been electronically signed by Pierre Mireles MD on 2017 5:59 AM

## 2017-03-17 NOTE — PROGRESS NOTES
[facility]  Anayeli Fuentes  : 1980  MRN: [id number]  CSN: [id number]    Postpartum Day #1  Subjective   Anayeli Fuentes was seen and examined this afternoon in NAD. Pt states pain is well controlled on current medications. (+)Ambulation, [place] WNL, (-)BM and (-)Flatus. Pt is breast feeding. Patient is currently undecided on birth control option.     Objective     Min/max vitals past 24 hours:   Temp  Min: 97.6 °F (36.4 °C)  Max: 99 °F (37.2 °C)  BP  Min: 107/60  Max: 139/75  Pulse  Min: 59  Max: 86  Pulse  Min: 59  Max: 86                        Gen: A&Ox3, NAD          HEENT: NC/AT, PERRL, EOMI, no facial asymetry          CV: RRR, S1 S2 apreciated, no murmurs, rubs, gallops          Respiratory: CTAB, bilateral symetrical chest rise.  Abdomen: soft, not tender; normal bowel sounds; no masses, Fundus firm and below the umbilicus, Incision C/D/I   Pelvic: Deferred         Extremities: No edema, No erythema    Lab Results   Component Value Date    WBC 14.47 (H) 2017    HGB 7.5 (L) 2017    HCT 24.6 (L) 2017    MCV 75.9 (L) 2017     2017    RH Positive 2017    HEPBSAG Negative 2016        Assessment  Anayeli Fuentes is a 36 y.o. year old  s/p Primary Low Transverse CS secondary to breech presentation day 1  1. Postpartum Day 1 S/P Primary Low  Transverse CS     Plan   1. Continue routine post op care - encourage ambulation  2. Pt is breast feeding  3. Patient is currently undecided on birth control option.             This document has been electronically signed by Pierre Mireles MD on 2017 2:55 PM

## 2017-03-17 NOTE — PLAN OF CARE
Problem: Patient Care Overview (Adult)  Goal: Plan of Care Review  Outcome: Ongoing (interventions implemented as appropriate)    17 0524   Coping/Psychosocial Response Interventions   Plan Of Care Reviewed With patient   Patient Care Overview   Progress improving   Outcome Evaluation   Outcome Summary/Follow up Plan pain controlled with PRN medication, ambulating in room, voids, VSS, lochia light       Goal: Adult Individualization and Mutuality  Outcome: Ongoing (interventions implemented as appropriate)    Problem: Postpartum ( Delivery) (Adult)  Goal: Signs and Symptoms of Listed Potential Problems Will be Absent or Manageable (Postpartum)  Outcome: Ongoing (interventions implemented as appropriate)    Problem: Breastfeeding (Adult,NICU,,Obstetrics,Pediatric)  Goal: Signs and Symptoms of Listed Potential Problems Will be Absent or Manageable (Breastfeeding)  Outcome: Ongoing (interventions implemented as appropriate)

## 2017-03-18 VITALS
HEIGHT: 70 IN | WEIGHT: 188 LBS | SYSTOLIC BLOOD PRESSURE: 109 MMHG | HEART RATE: 82 BPM | DIASTOLIC BLOOD PRESSURE: 73 MMHG | RESPIRATION RATE: 18 BRPM | TEMPERATURE: 99.1 F | BODY MASS INDEX: 26.92 KG/M2 | OXYGEN SATURATION: 99 %

## 2017-03-18 RX ORDER — OXYCODONE HYDROCHLORIDE AND ACETAMINOPHEN 5; 325 MG/1; MG/1
1-2 TABLET ORAL EVERY 4 HOURS PRN
Qty: 40 TABLET | Refills: 0 | Status: SHIPPED | OUTPATIENT
Start: 2017-03-18 | End: 2017-03-26

## 2017-03-18 RX ADMIN — OXYCODONE HYDROCHLORIDE AND ACETAMINOPHEN 1 TABLET: 5; 325 TABLET ORAL at 11:40

## 2017-03-18 RX ADMIN — OXYCODONE HYDROCHLORIDE AND ACETAMINOPHEN 1 TABLET: 5; 325 TABLET ORAL at 02:57

## 2017-03-18 RX ADMIN — FOLIC ACID 1 MG: 1 TABLET ORAL at 08:46

## 2017-03-18 RX ADMIN — PRENATAL VIT W/ FE FUMARATE-FA TAB 27-0.8 MG 1 TABLET: 27-0.8 TAB at 08:46

## 2017-03-18 RX ADMIN — FERROUS SULFATE TAB EC 324 MG (65 MG FE EQUIVALENT) 324 MG: 324 (65 FE) TABLET DELAYED RESPONSE at 08:46

## 2017-03-18 NOTE — PLAN OF CARE
Problem: Patient Care Overview (Adult)  Goal: Plan of Care Review  Outcome: Ongoing (interventions implemented as appropriate)  Goal: Adult Individualization and Mutuality  Outcome: Ongoing (interventions implemented as appropriate)    Problem: Postpartum ( Delivery) (Adult)  Goal: Signs and Symptoms of Listed Potential Problems Will be Absent or Manageable (Postpartum)  Outcome: Ongoing (interventions implemented as appropriate)    Problem: Breastfeeding (Adult,NICU,[place],Obstetrics,Pediatric)  Goal: Signs and Symptoms of Listed Potential Problems Will be Absent or Manageable (Breastfeeding)  Outcome: Outcome(s) achieved Date Met:  17

## 2017-03-18 NOTE — DISCHARGE SUMMARY
Alireza Fuentes  : 1980  MRN: [id number]  CSN: [id number]    Discharge Summary      Date of Admission: 3/16/2017   Date of Discharge:    Principle Discharge Dx: 1. IUP at 38+3, breech presentation   Procedures Performed: Procedure(s):   SECTION PRIMARY   Brief History: Patient is a 36 y.o. now  who presented to labor and delivery due to malpresentation with footling presentation.   Hospital Course: Her post-operative course was unremarkable.  On POD # 2 she expressed the desire for D/C. .  She had no febrile morbidity. She had normal bowel and bladder function and was hemodynamically stable.  Her wound was healing well without obvious signs of infections.   Pending Studies: none   Discharge Medications:    Your medication list      START taking these medications       Instructions Last Dose Given Next Dose Due    oxyCODONE-acetaminophen 5-325 MG per tablet   Commonly known as:  PERCOCET        Take 1-2 tablets by mouth Every 4 (Four) Hours As Needed (pain) for up to 8 days.           CONTINUE taking these medications       Instructions Last Dose Given Next Dose Due    folic acid 400 MCG tablet   Commonly known as:  FOLVITE              MULTIVITAMIN PO              PROAIR  (90 BASE) MCG/ACT inhaler   Generic drug:  albuterol              PROFERRIN-FORTE 12-1 MG tablet   Generic drug:  Fe Heme Polypeptide-Folic Acid        Take 1 tablet by mouth 3 (Three) Times a Day.              Where to Get Your Medications      You can get these medications from any pharmacy     Bring a paper prescription for each of these medications    • oxyCODONE-acetaminophen 5-325 MG per tablet            Discharge Disposition: home   Follow-up: No future appointments.       This note has been electronically signed.    Mino Bansal MD  2017

## 2017-03-18 NOTE — PROGRESS NOTES
[facility]  Anayeli Fuentes  : 1980  MRN: [id number]  CSN: [id number]    Postpartum Day #2  Subjective   Anayeli Fuentes was seen and examined this morning in NAD. Pt states pain is well controlled on current medications. (+)Ambulation, [place] WNL, (-)BM and (+)Flatus. Pt is breast feeding. Patient is currently undecided on birth control option.     Objective     Min/max vitals past 24 hours:   Temp  Min: 97.6 °F (36.4 °C)  Max: 98.6 °F (37 °C)  BP  Min: 102/60  Max: 139/75  Pulse  Min: 75  Max: 89  Pulse  Min: 75  Max: 89                        Gen: A&Ox3, NAD          HEENT: NC/AT, PERRL, EOMI, no facial asymetry          CV: RRR, S1 S2 apreciated, no murmurs, rubs, gallops          Respiratory: CTAB, bilateral symetrical chest rise.  Abdomen: soft, not tender; normal bowel sounds; no masses, Fundus firm and below the umbilicus, Incision C/D/I   Pelvic: Deferred         Extremities: No edema, No erythema    Lab Results   Component Value Date    WBC 14.47 (H) 2017    HGB 7.5 (L) 2017    HCT 24.6 (L) 2017    MCV 75.9 (L) 2017     2017    RH Positive 2017    HEPBSAG Negative 2016        Assessment  Anayeli Fuentes is a 36 y.o. year old  s/p Primary Low Transverse CS secondary to breech presentation day 2  1. Postpartum Day 2 S/P Primary Low  Transverse CS     Plan   1. Continue routine post op care - encourage ambulation  2. Pt is breast feeding  3. Patient is currently undecided on birth control option.             This document has been electronically signed by Pierre Mireles MD on 2017 7:07 AM

## 2017-03-21 NOTE — OP NOTE
DATE OF PROCEDURE: 2017    PREOPERATIVE DIAGNOSES:    1.   Intrauterine pregnancy at 38-3/7 weeks.   2.   Footling breech presentation with advanced cervical dilation.      POSTOPERATIVE DIAGNOSES:     1.   Intrauterine pregnancy at 38-3/7 weeks.   2.   Footling breech presentation with advanced cervical dilation.      PROCEDURE PERFORMED:  Primary  section.     SURGEON:  Mino Bansal MD     ASSISTANT: Anayeli Christensen CSA     ANESTHESIA:  Spinal.      ESTIMATED BLOOD LOSS:   500 mL.      COMPLICATIONS:  None.     COUNTS:  Correct.      FINDINGS:  Normal-appearing female infant.  Weight pending at time of dictation with Apgar scores of 8 at one minute and 9 at five minutes.  Normal-appearing Lerma placenta with 2 arteries and 1 vein.     DESCRIPTION OF PROCEDURE:  After informed consent was obtained, after the consent as signed and in order, the patient was brought to the operating room, placed on the operating room table in the left lateral tilt, positioned in preparation for her  section.  After an adequate level of spinal anesthesia was noted, the patient was routinely prepped, had her bladder drained of urine and draped in the usual sterile manner.  A timeout was performed and incision was then made in the skin with the knife through the patient’s prior incision.  The incision was extended down to the level of the fascia.  The fascia was nicked in the midline.  The incision extended bilaterally.  Two Kocher clamps were then used to grasp the upper edges of the underlying muscles dissected off the overlying fascia superiorly.   The midline between rectus muscles were identified and entered sharply.  The peritoneum was then entered sharply.  This was then bluntly extended.  Bladder blade was placed into position.  Small incision was scored introducing puncture the uterine cavity.  Clear fluid was noted.  This was then bluntly extended in a transverse fashion.  The left leg was then  brought up followed by the right leg and the remaining part of the infant was delivered up to the level of the shoulders.  Each arm was then swept across the chest and delivered and the head then easily followed.  There was a nuchal cord x1, which was then reduced.  The cord was doubly clamped and cut.   The infant was passed off.  The cord blood was obtained.  The placenta was spontaneously delivered.  The uterus was then exteriorized and wiped out with a moist lap pad.  The incision was then closed using a 0 Vicryl in a running locking fashion followed a second imbricating layer of 0 Vicryl.  There was 1 area of bleeding which was oversewn using 0 Vicryl in an interrupted figure-of-eight fashion.  At this point, the uterus was replaced into the abdominal cavity.  The abdominal cavity was copiously irrigated and suctioned.  Reinspection of the incision revealed that it remained hemostatic.  The peritoneum was closed using 2-0 Vicryl.  The muscle layer was inspected and appeared hemostatic.  The fascia was closed using 0 PDS.  The subcutaneous layer was irrigated and suctioned.  All bleeding areas were noted as cauterized.  The skin was closed using a 3-0 Monocryl.  A sterile pressure dressing was applied.  The patient and infant tolerated the procedure well and will go to the recovery room in satisfactory condition.             CC:            Mino Bansal MD  Dictation Date/Time: 03/16/2017 14:07:03(Eastern Time Zone)  Transcribed Date/Time: 03/16/2017 14:57:02 (Eastern Time Zone)  Dictator/ Initials:  RIO/shona  Document ID:                [id number]  Job ID: [id number]  (dx/proc)

## 2017-04-03 ENCOUNTER — OFFICE VISIT (OUTPATIENT)
Dept: OBSTETRICS AND GYNECOLOGY | Facility: CLINIC | Age: 37
End: 2017-04-03

## 2017-04-03 DIAGNOSIS — Z98.891 STATUS POST CESAREAN SECTION ROUTINE POSTPARTUM FOLLOW-UP: Primary | ICD-10-CM

## 2017-04-03 PROCEDURE — 0503F POSTPARTUM CARE VISIT: CPT | Performed by: OBSTETRICS & GYNECOLOGY

## 2017-04-03 RX ORDER — FERROUS SULFATE 325(65) MG
325 TABLET ORAL
COMMUNITY
End: 2020-05-07

## 2017-04-03 NOTE — PROGRESS NOTES
Subjective   Chief Complaint   Patient presents with   • Post-op     Anayeli Fuentes is a 36 y.o. year old  presenting for a postpartum visit.  She had a Primary  (LTCS).   Prenatal course was been complicated by malpresentation (breech).    Since delivery she has not been sexually active.  She does not have concerns about post-partum blues/depression.   She is breast feeding    The following portions of the patient's history were reviewed and updated as appropriate:current medications, allergies and past surgical history    Smoking status: Never Smoker                                                              Smokeless status: Never Used                        Review of Systems      Objective   LMP 2016 (Exact Date)  Breastfeeding? Yes    General:  well developed; well nourished  no acute distress   Abdomen: soft, non-tender; no masses  no umbilical or inginual hernias are present  no hepato-splenomegaly  incision is clean, dry and intact   Pelvis: Not performed.          Assessment   1. Normal 2 week postpartum exam  2. Thinking about birth control, is considering Nexplanon versus a Mirena     Plan   1. Follow-up in 4 weeks for her 6 week postpartum exam         This note was electronically signed.    Mino Bansal MD  April 3, 2017

## 2017-05-04 ENCOUNTER — PROCEDURE VISIT (OUTPATIENT)
Dept: OBSTETRICS AND GYNECOLOGY | Facility: CLINIC | Age: 37
End: 2017-05-04

## 2017-05-04 DIAGNOSIS — Z98.891 STATUS POST CESAREAN SECTION ROUTINE POSTPARTUM FOLLOW-UP: Primary | ICD-10-CM

## 2017-05-04 DIAGNOSIS — Z30.430 ENCOUNTER FOR IUD INSERTION: ICD-10-CM

## 2017-05-04 PROCEDURE — 0503F POSTPARTUM CARE VISIT: CPT | Performed by: OBSTETRICS & GYNECOLOGY

## 2017-05-04 PROCEDURE — 88142 CYTOPATH C/V THIN LAYER: CPT | Performed by: OBSTETRICS & GYNECOLOGY

## 2017-05-04 PROCEDURE — 58300 INSERT INTRAUTERINE DEVICE: CPT | Performed by: OBSTETRICS & GYNECOLOGY

## 2017-05-04 PROCEDURE — 87624 HPV HI-RISK TYP POOLED RSLT: CPT | Performed by: OBSTETRICS & GYNECOLOGY

## 2017-05-08 LAB
LAB AP CASE REPORT: NORMAL
LAB AP GYN ADDITIONAL INFORMATION: NORMAL
Lab: NORMAL
PATH INTERP SPEC-IMP: NORMAL
STAT OF ADQ CVX/VAG CYTO-IMP: NORMAL

## 2017-05-11 ENCOUNTER — TELEPHONE (OUTPATIENT)
Dept: OBSTETRICS AND GYNECOLOGY | Facility: HOSPITAL | Age: 37
End: 2017-05-11

## 2017-05-11 LAB — HPV I/H RISK 4 DNA CVX QL PROBE+SIG AMP: NEGATIVE

## 2017-05-18 ENCOUNTER — TELEPHONE (OUTPATIENT)
Dept: OBSTETRICS AND GYNECOLOGY | Facility: HOSPITAL | Age: 37
End: 2017-05-18

## 2017-05-22 ENCOUNTER — OFFICE VISIT (OUTPATIENT)
Dept: OBSTETRICS AND GYNECOLOGY | Facility: CLINIC | Age: 37
End: 2017-05-22

## 2017-05-22 VITALS
DIASTOLIC BLOOD PRESSURE: 62 MMHG | SYSTOLIC BLOOD PRESSURE: 100 MMHG | WEIGHT: 153 LBS | BODY MASS INDEX: 21.9 KG/M2 | HEIGHT: 70 IN

## 2017-05-22 DIAGNOSIS — Z30.431 IUD CHECK UP: Primary | ICD-10-CM

## 2017-05-22 PROCEDURE — 99212 OFFICE O/P EST SF 10 MIN: CPT | Performed by: OBSTETRICS & GYNECOLOGY

## 2019-08-09 ENCOUNTER — PROCEDURE VISIT (OUTPATIENT)
Dept: OBSTETRICS AND GYNECOLOGY | Facility: CLINIC | Age: 39
End: 2019-08-09

## 2019-08-09 VITALS
DIASTOLIC BLOOD PRESSURE: 52 MMHG | BODY MASS INDEX: 19.04 KG/M2 | SYSTOLIC BLOOD PRESSURE: 98 MMHG | WEIGHT: 133 LBS | HEIGHT: 70 IN

## 2019-08-09 DIAGNOSIS — Z30.432 ENCOUNTER FOR IUD REMOVAL: Primary | ICD-10-CM

## 2019-08-09 PROBLEM — O99.840 PREGNANCY COMPLICATED BY PREVIOUS GASTRIC BYPASS, ANTEPARTUM: Status: RESOLVED | Noted: 2017-01-05 | Resolved: 2019-08-09

## 2019-08-09 PROCEDURE — 58301 REMOVE INTRAUTERINE DEVICE: CPT | Performed by: OBSTETRICS & GYNECOLOGY

## 2019-08-09 NOTE — PROGRESS NOTES
GYN PROBLEM VISIT    CC: IUD removal    HPI  Anayeli Fuentes is a 38 y.o.  premenopausal female who presents for IUD removal. She is interested in having another child.    Denies any vaginal itching, burning, irritation, or discharge. Denies any abnormal uterine bleeding. Denies any sexual dysfunction concerns. Denies any urinary symptoms including incontinence, dysuria, frequency, urgency, nocturia.    ROS  As above in HPI  Constitutional: No unintentional weight loss, fatigue, fever, or chills.     CV: No chest pain, palpitations, or orthopnea.  Resp: No SOB, wheezing, or cough.  Breasts: No masses, pain, skin change, or nipple discharge.   Gastrointestinal: No abdominal pain, nausea, vomiting, diarrhea, constipation, hematochezia, melena, or stool change.   Endocrine: No heat or cold intolerance.      Neuro: No weakness or difficulty ambulating  Other ROS neg.     OB HISTORY  OB History    Para Term  AB Living   1 1 1     1   SAB TAB Ectopic Molar Multiple Live Births           0 1      # Outcome Date GA Lbr Luciano/2nd Weight Sex Delivery Anes PTL Lv   1 Term 17 38w3d  2835 g (6 lb 4 oz) F CS-LTranv Spinal  HORTENCIA        PAST MEDICAL HISTORY  Past Medical History:   Diagnosis Date   • Abdominal pain    • Asthma    • Chronic cholecystitis with calculus    • Iron deficiency anemia    • Kidney stone    • Pelvic congestion syndrome      PAST SURGICAL HISTORY  Past Surgical History:   Procedure Laterality Date   •  SECTION N/A 3/16/2017    Procedure:  SECTION PRIMARY;  Surgeon: Mino Bansal MD;  Location: [facility] LABOR DELIVERY;  Service:    • CHOLECYSTECTOMY WITH INTRAOPERATIVE CHOLANGIOGRAM  2014   • GASTRIC BANDING     • GASTRIC BYPASS       FAMILY HISTORY  Family History   Problem Relation Age of Onset   • Breast cancer Other    • Colon cancer Neg Hx    • Endometrial cancer Neg Hx    • Ovarian cancer Neg Hx      SOCIAL HISTORY  Social History  "    Socioeconomic History   • Marital status:      Spouse name: Not on file   • Number of children: Not on file   • Years of education: Not on file   • Highest education level: Not on file   Tobacco Use   • Smoking status: Never Smoker   • Smokeless tobacco: Never Used   Substance and Sexual Activity   • Alcohol use: No   • Drug use: No   • Sexual activity: Yes     ALLERGIES  Allergies   Allergen Reactions   • Nsaids Shortness Of Breath   • Septra [Sulfamethoxazole-Trimethoprim]      *childhood Rx   • Zinacef [Cefuroxime In Sterile Water]      Childhood Rx     HOME MEDICATIONS  Prior to Admission medications    Medication Sig Start Date End Date Taking? Authorizing Provider   ferrous sulfate 325 (65 FE) MG tablet Take 325 mg by mouth Daily With Breakfast.   Yes Ridge Otto MD   Multiple Vitamins-Minerals (MULTIVITAMIN PO) Take 1 tablet by mouth daily.   Yes Ridge Otto MD   albuterol (PROAIR HFA) 108 (90 BASE) MCG/ACT inhaler Inhale 2 puffs.    ProviderRidge MD   Fe Heme Polypeptide-Folic Acid (PROFERRIN-FORTE) 12-1 MG tablet Take 1 tablet by mouth 3 (Three) Times a Day. 8/9/19   Fernanda Hancock MD   Fe Heme Polypeptide-Folic Acid (PROFERRIN-FORTE) 12-1 MG tablet Take 1 tablet by mouth 3 (Three) Times a Day. 6/20/18 8/9/19 Friday, Mel DAVE MD       PE  BP 98/52   Ht 177.8 cm (70\")   Wt 60.3 kg (133 lb)   BMI 19.08 kg/m²        PELVIC EXAM:  External Genitalia/Vulva: Anatomy is normal, no significant redness of labia, no discharge on vulvar tissues, [place]'s and [place]th[place]'s glands are normal, no ulcers, no condylomatous lesions    Urethra: Normal, no lesions   Vagina: Vaginal tissues are not inflamed, normal color and texture, no significant discharge present  Cervix: Normal in appearance without lesions or purulent discharge, no cervical motion tenderness. IUD strings visualized, grasped. Patient tolerated well; IUD visualized and intact.  Uterus: Normal size, " shape, and consistency.  Adnexa: Normal size and shape bilaterally, no palpable mass bilaterally and non-tender bilaterally      IMPRESSION  Anayeli Fuentes is a 38 y.o.  presenting for IUD removal. Tolerated well.    PLAN    1. Encounter for IUD removal  - RTC PRN for annual GYN exam or pregnancy      This document has been electronically signed by Fernanda Hancock MD on 2019 4:20 PM.

## 2020-03-12 DIAGNOSIS — J45.21 MILD INTERMITTENT ASTHMA WITH EXACERBATION: Primary | ICD-10-CM

## 2020-03-12 RX ORDER — ALBUTEROL SULFATE 90 UG/1
2 AEROSOL, METERED RESPIRATORY (INHALATION) EVERY 6 HOURS PRN
Qty: 18 G | Refills: 6 | Status: SHIPPED | OUTPATIENT
Start: 2020-03-12

## 2020-04-14 ENCOUNTER — LAB (OUTPATIENT)
Dept: LAB | Facility: HOSPITAL | Age: 40
End: 2020-04-14

## 2020-04-14 DIAGNOSIS — Z32.01 POSITIVE PREGNANCY TEST: ICD-10-CM

## 2020-04-14 DIAGNOSIS — Z32.01 POSITIVE PREGNANCY TEST: Primary | ICD-10-CM

## 2020-04-14 LAB — HCG INTACT+B SERPL-ACNC: NORMAL MIU/ML

## 2020-04-14 PROCEDURE — 36415 COLL VENOUS BLD VENIPUNCTURE: CPT

## 2020-04-14 PROCEDURE — 84702 CHORIONIC GONADOTROPIN TEST: CPT

## 2020-05-07 ENCOUNTER — APPOINTMENT (OUTPATIENT)
Dept: LAB | Facility: HOSPITAL | Age: 40
End: 2020-05-07

## 2020-05-07 ENCOUNTER — INITIAL PRENATAL (OUTPATIENT)
Dept: OBSTETRICS AND GYNECOLOGY | Facility: CLINIC | Age: 40
End: 2020-05-07

## 2020-05-07 VITALS — SYSTOLIC BLOOD PRESSURE: 106 MMHG | DIASTOLIC BLOOD PRESSURE: 64 MMHG | WEIGHT: 138 LBS | BODY MASS INDEX: 19.8 KG/M2

## 2020-05-07 DIAGNOSIS — Z98.84 HISTORY OF GASTRIC BYPASS: ICD-10-CM

## 2020-05-07 DIAGNOSIS — Z3A.09 9 WEEKS GESTATION OF PREGNANCY: ICD-10-CM

## 2020-05-07 DIAGNOSIS — O09.91 SUPERVISION OF HIGH RISK PREGNANCY IN FIRST TRIMESTER: Primary | ICD-10-CM

## 2020-05-07 DIAGNOSIS — O34.219 PREVIOUS CESAREAN DELIVERY AFFECTING PREGNANCY: ICD-10-CM

## 2020-05-07 DIAGNOSIS — O09.521 MULTIGRAVIDA OF ADVANCED MATERNAL AGE IN FIRST TRIMESTER: ICD-10-CM

## 2020-05-07 DIAGNOSIS — E55.9 VITAMIN D DEFICIENCY: ICD-10-CM

## 2020-05-07 LAB
25(OH)D3 SERPL-MCNC: 22.4 NG/ML (ref 30–100)
ABO GROUP BLD: NORMAL
AMPHET+METHAMPHET UR QL: NEGATIVE
AMPHETAMINES UR QL: NEGATIVE
BARBITURATES UR QL SCN: NEGATIVE
BASOPHILS # BLD AUTO: 0.13 10*3/MM3 (ref 0–0.2)
BASOPHILS NFR BLD AUTO: 1 % (ref 0–1.5)
BENZODIAZ UR QL SCN: NEGATIVE
BILIRUB UR QL STRIP: NEGATIVE
BLD GP AB SCN SERPL QL: NEGATIVE
BUPRENORPHINE SERPL-MCNC: NEGATIVE NG/ML
CANNABINOIDS SERPL QL: NEGATIVE
CLARITY UR: CLEAR
COCAINE UR QL: NEGATIVE
COLOR UR: ABNORMAL
DEPRECATED RDW RBC AUTO: 47.8 FL (ref 37–54)
EOSINOPHIL # BLD AUTO: 0.42 10*3/MM3 (ref 0–0.4)
EOSINOPHIL NFR BLD AUTO: 3.4 % (ref 0.3–6.2)
ERYTHROCYTE [DISTWIDTH] IN BLOOD BY AUTOMATED COUNT: 17 % (ref 12.3–15.4)
GLUCOSE UR STRIP-MCNC: NEGATIVE MG/DL
HBV SURFACE AG SERPL QL IA: NORMAL
HCT VFR BLD AUTO: 36.5 % (ref 34–46.6)
HCV AB SER DONR QL: NORMAL
HGB BLD-MCNC: 11.7 G/DL (ref 12–15.9)
HGB UR QL STRIP.AUTO: NEGATIVE
HIV1+2 AB SER QL: NORMAL
IMM GRANULOCYTES # BLD AUTO: 0.04 10*3/MM3 (ref 0–0.05)
IMM GRANULOCYTES NFR BLD AUTO: 0.3 % (ref 0–0.5)
KETONES UR QL STRIP: ABNORMAL
LEUKOCYTE ESTERASE UR QL STRIP.AUTO: NEGATIVE
LYMPHOCYTES # BLD AUTO: 2.76 10*3/MM3 (ref 0.7–3.1)
LYMPHOCYTES NFR BLD AUTO: 22.1 % (ref 19.6–45.3)
Lab: NORMAL
MCH RBC QN AUTO: 25.3 PG (ref 26.6–33)
MCHC RBC AUTO-ENTMCNC: 32.1 G/DL (ref 31.5–35.7)
MCV RBC AUTO: 78.8 FL (ref 79–97)
METHADONE UR QL SCN: NEGATIVE
MONOCYTES # BLD AUTO: 0.62 10*3/MM3 (ref 0.1–0.9)
MONOCYTES NFR BLD AUTO: 5 % (ref 5–12)
NEUTROPHILS # BLD AUTO: 8.53 10*3/MM3 (ref 1.7–7)
NEUTROPHILS NFR BLD AUTO: 68.2 % (ref 42.7–76)
NITRITE UR QL STRIP: NEGATIVE
NRBC BLD AUTO-RTO: 0 /100 WBC (ref 0–0.2)
OPIATES UR QL: NEGATIVE
OXYCODONE UR QL SCN: NEGATIVE
PCP UR QL SCN: NEGATIVE
PH UR STRIP.AUTO: <=5 [PH] (ref 5–8)
PLATELET # BLD AUTO: 431 10*3/MM3 (ref 140–450)
PMV BLD AUTO: 9.9 FL (ref 6–12)
PROPOXYPH UR QL: NEGATIVE
PROT UR QL STRIP: NEGATIVE
RBC # BLD AUTO: 4.63 10*6/MM3 (ref 3.77–5.28)
RH BLD: POSITIVE
RPR SER QL: NORMAL
RUBV IGG SERPL IA-ACNC: NEGATIVE
SP GR UR STRIP: >1.03 (ref 1–1.03)
TRICYCLICS UR QL SCN: NEGATIVE
UROBILINOGEN UR QL STRIP: ABNORMAL
WBC NRBC COR # BLD: 12.5 10*3/MM3 (ref 3.4–10.8)

## 2020-05-07 PROCEDURE — 80081 OBSTETRIC PANEL INC HIV TSTG: CPT | Performed by: OBSTETRICS & GYNECOLOGY

## 2020-05-07 PROCEDURE — 87491 CHLMYD TRACH DNA AMP PROBE: CPT | Performed by: OBSTETRICS & GYNECOLOGY

## 2020-05-07 PROCEDURE — 86803 HEPATITIS C AB TEST: CPT | Performed by: OBSTETRICS & GYNECOLOGY

## 2020-05-07 PROCEDURE — 82306 VITAMIN D 25 HYDROXY: CPT | Performed by: OBSTETRICS & GYNECOLOGY

## 2020-05-07 PROCEDURE — 87591 N.GONORRHOEAE DNA AMP PROB: CPT | Performed by: OBSTETRICS & GYNECOLOGY

## 2020-05-07 PROCEDURE — 81003 URINALYSIS AUTO W/O SCOPE: CPT | Performed by: OBSTETRICS & GYNECOLOGY

## 2020-05-07 PROCEDURE — 87661 TRICHOMONAS VAGINALIS AMPLIF: CPT | Performed by: OBSTETRICS & GYNECOLOGY

## 2020-05-07 PROCEDURE — 36415 COLL VENOUS BLD VENIPUNCTURE: CPT | Performed by: OBSTETRICS & GYNECOLOGY

## 2020-05-07 PROCEDURE — 80306 DRUG TEST PRSMV INSTRMNT: CPT | Performed by: OBSTETRICS & GYNECOLOGY

## 2020-05-07 PROCEDURE — 87086 URINE CULTURE/COLONY COUNT: CPT | Performed by: OBSTETRICS & GYNECOLOGY

## 2020-05-07 PROCEDURE — 0501F PRENATAL FLOW SHEET: CPT | Performed by: OBSTETRICS & GYNECOLOGY

## 2020-05-07 NOTE — PROGRESS NOTES
[facility]  Obstetrics Visit    CHIEF COMPLAINT:  New prenatal visit    HISTORY OF PRESENT ILLNESS:  Anayeli Fuentes is a 39 y.o. y/o  at 9w2d by LMP = 1TUS.  This was a planned pregnancy and the patient is supported by her  Ruben.  Reports some nausea, but minimal vomiting.  Reports breast tenderness.  She denies any vaginal bleeding.  She has started taking a prenatal vitamin.    REVIEW OF SYSTEMS  Review of Systems   Constitutional: Negative.    HENT: Negative.    Eyes: Negative.    Respiratory: Negative.    Cardiovascular: Negative.    Gastrointestinal: Positive for nausea.   Endocrine: Negative.    Genitourinary: Negative.    Musculoskeletal: Negative.    Skin: Negative.    Allergic/Immunologic: Negative.    Neurological: Negative.    Hematological: Negative.    Psychiatric/Behavioral: Negative.      PRENATAL RISK FACTORS   Problems (from 20 to present)     Problem Noted Resolved    Supervision of high risk pregnancy in first trimester 2020 by Fernanda Hancock MD No    Previous  delivery affecting pregnancy 2020 by Fernanda Hancock MD No    Overview Signed 2020  2:27 PM by Fernadna Hancock MD     Secondary to breech presentation  Undecided  vs RLTCS         History of gastric bypass 2020 by Fernanda Hancock MD No    Overview Signed 2020  2:27 PM by Fernanda Hancock MD     No glucola at 28 weeks; will check blood sugars x1 week         Multigravida of advanced maternal age in first trimester 2020 by Fernanda Hancock MD No    Overview Signed 2020  2:52 PM by Fernanda Hancock MD     Counseled NIPT             DATING CRITERIA:  LMP (3/3/2020) -- PRASANNA 2020  1TUS (2020 at 7w1d) -- PRASANNA 2020    OBSTETRIC HISTORY:  OB History    Para Term  AB Living   2 1 1     1   SAB TAB Ectopic Molar Multiple Live Births            0 1      # Outcome Date GA Lbr Luciano/2nd Weight Sex Delivery Anes PTL Lv   2 Current            1 Term 17 38w3d  2835 g (6 lb 4 oz) F CS-LTranv Spinal  HORTENCIA     GYN HISTORY:  Denies h/o sexually transmitted infections/pelvic inflammatory disease  Denies h/o abnormal pap smears  Last pap smear:   Last Completed Pap Smear       Status Date      PAP SMEAR Done 2017 LIQUID-BASED PAP SMEAR, SCREENING     Patient has more history with this topic...      Denies h/o gynecologic surgeries, including biopsies of the cervix    PAST MEDICAL HISTORY:  Past Medical History:   Diagnosis Date   • Abdominal pain    • Asthma    • Chronic cholecystitis with calculus    • Iron deficiency anemia    • Kidney stone    • Pelvic congestion syndrome      PAST SURGICAL HISTORY:  Past Surgical History:   Procedure Laterality Date   •  SECTION N/A 3/16/2017    Procedure:  SECTION PRIMARY;  Surgeon: Mino Bansal MD;  Location: [facility] LABOR DELIVERY;  Service:    • CHOLECYSTECTOMY WITH INTRAOPERATIVE CHOLANGIOGRAM  2014   • GASTRIC BANDING     • GASTRIC BYPASS       FAMILY HISTORY:  Family History   Problem Relation Age of Onset   • Breast cancer Other    • Colon cancer Neg Hx    • Endometrial cancer Neg Hx    • Ovarian cancer Neg Hx      SOCIAL HISTORY:  Social History     Socioeconomic History   • Marital status:      Spouse name: Not on file   • Number of children: Not on file   • Years of education: Not on file   • Highest education level: Not on file   Tobacco Use   • Smoking status: Never Smoker   • Smokeless tobacco: Never Used   Substance and Sexual Activity   • Alcohol use: No   • Drug use: No   • Sexual activity: Yes     GENETIC SCREENING:  Age >34 yo as of PRASANNA: yes  Thalassemia: no  NTD: no  CHD: no  Down Syndrome/MR/Fragile X/Autism: no  Ashkenazi Anglican with Walter-Sachs, [place], familial dysautonomia: no  Sickle cell disease or trait: no  Hemophilia: no  Muscular dystrophy: no  Cystic  fibrosis: no  [place]'s chorea: no  Birth defects: no  Genetic/chromosomal disorders: no    INFECTION HISTORY:  TB exposure: no  HSV: no  Illness since LMP: no  Prior GBS infected child: N/A  STIs: no    ALLERGIES:  Allergies   Allergen Reactions   • Nsaids Shortness Of Breath   • Septra [Sulfamethoxazole-Trimethoprim]      *childhood Rx   • Zinacef [Cefuroxime In Sterile Water]      Childhood Rx     MEDICATIONS:  Prior to Admission medications    Medication Sig Start Date End Date Taking? Authorizing Provider   albuterol sulfate HFA (ProAir HFA) 108 (90 Base) MCG/ACT inhaler Inhale 2 puffs Every 6 (Six) Hours As Needed for Wheezing. 3/12/20   Fernanda Hancock MD   Fe Heme Polypeptide-Folic Acid (Proferrin-Forte) 12-1 MG tablet Take 1 tablet by mouth 3 (Three) Times a Day. 8/9/19   Fernanda Hancock MD   ferrous sulfate 325 (65 FE) MG tablet Take 325 mg by mouth Daily With Breakfast.    ProviderRidge MD   Multiple Vitamins-Minerals (MULTIVITAMIN PO) Take 1 tablet by mouth daily.    ProviderRidge MD     PHYSICAL EXAM:   /64   Wt 62.6 kg (138 lb)   LMP 03/03/2020 (Exact Date)   BMI 19.80 kg/m²   General: Alert, healthy, no distress, well nourished and well developed.  Neurologic: Alert, oriented to person, place, and time.  Gait normal.  Cranial nerves II-XII grossly intact.  HEENT: Normocephalic, atraumatic.  Extraocular muscles intact, pupils equal and reactive x2.    Teeth: Normal hygiene.  Neck: Supple, no adenopathy, thyroid normal size, non-tender, without nodularity, trachea midline.  Lungs: Normal respiratory effort.  Clear to auscultation bilaterally.  No wheezes, rhonci, or rales.  Heart: Regular rate and rhythm.  No murmer, rub or gallop.  Abdomen: Soft, non-tender, non-distended,no masses, no hepatosplenomegaly, no hernia.  Skin: No rash, no lesions.  Extremities: No cyanosis, clubbing or edema.    Bedside ultrasound performed by myself which shows the  findings below:  Viable IUP,  bpm    IMPRESSION:  Anayeli Fuentes is a 39 y.o.  at 9w2d for a new prenatal visit.    PLAN:  1.  IUP at 9w2d  - Options counseling performed and patient desires continuation of pregnancy to term   - Prenatal labs ordered  - Genetic testing including cystic fibrosis was discussed and patient undecided  - Continue prenatal vitamins  - Weight gain counseling performed.   - BMI 18.5-24.9: 25-35 lb  - Return to clinic in 4 weeks for return prenatal visit  - Reviewed COVID-19 visitation policy  - Reviewed COVID-19 precautions     Diagnosis Plan   1. Supervision of high risk pregnancy in first trimester     2. Previous  delivery affecting pregnancy  Undecided  vs RLTCS   3. History of gastric bypass     4. 9 weeks gestation of pregnancy  Chlamydia trachomatis, Neisseria gonorrhoeae, Trichomonas vaginalis, PCR - Urine, Urine, Clean Catch    OB Panel With HIV    Urinalysis without microscopic (no culture) - Urine, Clean Catch    Urine Culture - Urine, Urine, Clean Catch    Urine Drug Screen - Urine, Clean Catch    Urinalysis without microscopic (no culture) - Urine, Clean Catch    RPR    CBC Auto Differential    Hepatitis B Surface Antigen    Rubella Antibody, IgG    OB Panel Type & Screen    HIV-1 / O / 2 Ag / Antibody 4th Generation    Hepatitis C Antibody    Hep B Confirmation Tube   5. Vitamin D deficiency  Vitamin D 25 Hydroxy    Vitamin D 25 Hydroxy   6. Multigravida of advanced maternal age in first trimester  Discussed NIPT; will consider     Fernanda Hancock MD  2020  14:52

## 2020-05-08 ENCOUNTER — TELEPHONE (OUTPATIENT)
Dept: OBSTETRICS AND GYNECOLOGY | Facility: CLINIC | Age: 40
End: 2020-05-08

## 2020-05-08 LAB
BACTERIA SPEC AEROBE CULT: NO GROWTH
C TRACH RRNA CVX QL NAA+PROBE: NEGATIVE
HOLD SPECIMEN: NORMAL
N GONORRHOEA RRNA SPEC QL NAA+PROBE: NEGATIVE
TRICHOMONAS VAGINALIS PCR: NEGATIVE

## 2020-05-08 NOTE — TELEPHONE ENCOUNTER
----- Message from Fernanda Hancock MD sent at 5/8/2020  6:53 AM CDT -----  Please let her know that vitamin D level is low.  She should start OTC supplementation (2000 units).

## 2020-05-11 ENCOUNTER — TELEPHONE (OUTPATIENT)
Dept: OBSTETRICS AND GYNECOLOGY | Facility: CLINIC | Age: 40
End: 2020-05-11

## 2020-05-11 DIAGNOSIS — O21.9 NAUSEA AND VOMITING IN PREGNANCY PRIOR TO 22 WEEKS GESTATION: Primary | ICD-10-CM

## 2020-05-11 RX ORDER — ONDANSETRON 4 MG/1
4 TABLET, FILM COATED ORAL DAILY PRN
Qty: 30 TABLET | Refills: 4 | Status: SHIPPED | OUTPATIENT
Start: 2020-05-11 | End: 2021-01-04

## 2020-06-04 ENCOUNTER — ROUTINE PRENATAL (OUTPATIENT)
Dept: OBSTETRICS AND GYNECOLOGY | Facility: CLINIC | Age: 40
End: 2020-06-04

## 2020-06-04 VITALS — WEIGHT: 140.2 LBS | DIASTOLIC BLOOD PRESSURE: 70 MMHG | BODY MASS INDEX: 20.12 KG/M2 | SYSTOLIC BLOOD PRESSURE: 108 MMHG

## 2020-06-04 DIAGNOSIS — Z3A.13 13 WEEKS GESTATION OF PREGNANCY: ICD-10-CM

## 2020-06-04 DIAGNOSIS — Z98.84 HISTORY OF GASTRIC BYPASS: ICD-10-CM

## 2020-06-04 DIAGNOSIS — O34.219 PREVIOUS CESAREAN DELIVERY AFFECTING PREGNANCY: ICD-10-CM

## 2020-06-04 DIAGNOSIS — O09.521 MULTIGRAVIDA OF ADVANCED MATERNAL AGE IN FIRST TRIMESTER: ICD-10-CM

## 2020-06-04 DIAGNOSIS — Z36.3 ANTENATAL SCREENING FOR MALFORMATION USING ULTRASONICS: ICD-10-CM

## 2020-06-04 DIAGNOSIS — O09.91 SUPERVISION OF HIGH RISK PREGNANCY IN FIRST TRIMESTER: Primary | ICD-10-CM

## 2020-06-04 PROCEDURE — 0502F SUBSEQUENT PRENATAL CARE: CPT | Performed by: OBSTETRICS & GYNECOLOGY

## 2020-06-04 NOTE — PROGRESS NOTES
CC: Prenatal visit    Anayeli Fuentes is a 39 y.o.  at 13w2d.  Doing well.  Denies contractions, LOF, or VB.    /70   Wt 63.6 kg (140 lb 3.2 oz)   LMP 2020 (Exact Date)   BMI 20.12 kg/m²   Fetal Heart Rate: 132     Problems (from 20 to present)     Problem Noted Resolved    Supervision of high risk pregnancy in first trimester 2020 by Fernanda Hancock MD No    Previous  delivery affecting pregnancy 2020 by Fernanda Hancock MD No    Overview Signed 2020  2:27 PM by Fernanda Hancock MD     Secondary to breech presentation  Undecided  vs RLTCS         History of gastric bypass 2020 by Fernanda Hancock MD No    Overview Signed 2020  2:27 PM by Fernanda Hancock MD     No glucola at 28 weeks; will check blood sugars x1 week         Multigravida of advanced maternal age in first trimester 2020 by Fernanda Hancock MD No    Overview Signed 2020  2:52 PM by Fernanda Hancock MD     Counseled NIPT             A/P: Anayeli Fuentes is a 39 y.o.  at 13w2d.  - RTC in 5 weeks w/ anatomy US  - Reviewed COVID-19 visitation policy  - Reviewed COVID-19 precautions     Diagnosis Plan   1. Supervision of high risk pregnancy in first trimester     2. Previous  delivery affecting pregnancy     3. History of gastric bypass     4. Multigravida of advanced maternal age in first trimester     5.  screening for malformation using ultrasonics  US Ob 14 + Weeks Single or First Gestation   6. 13 weeks gestation of pregnancy       Fernanda Hancock MD  2020  16:13

## 2020-07-14 ENCOUNTER — ROUTINE PRENATAL (OUTPATIENT)
Dept: OBSTETRICS AND GYNECOLOGY | Facility: CLINIC | Age: 40
End: 2020-07-14

## 2020-07-14 VITALS — SYSTOLIC BLOOD PRESSURE: 92 MMHG | WEIGHT: 149 LBS | DIASTOLIC BLOOD PRESSURE: 66 MMHG | BODY MASS INDEX: 21.38 KG/M2

## 2020-07-14 DIAGNOSIS — O34.219 PREVIOUS CESAREAN DELIVERY AFFECTING PREGNANCY: ICD-10-CM

## 2020-07-14 DIAGNOSIS — Z98.84 HISTORY OF GASTRIC BYPASS: ICD-10-CM

## 2020-07-14 DIAGNOSIS — O09.92 SUPERVISION OF HIGH RISK PREGNANCY IN SECOND TRIMESTER: Primary | ICD-10-CM

## 2020-07-14 DIAGNOSIS — Z3A.19 19 WEEKS GESTATION OF PREGNANCY: ICD-10-CM

## 2020-07-14 DIAGNOSIS — O09.522 MULTIGRAVIDA OF ADVANCED MATERNAL AGE IN SECOND TRIMESTER: ICD-10-CM

## 2020-07-14 PROCEDURE — 0502F SUBSEQUENT PRENATAL CARE: CPT | Performed by: OBSTETRICS & GYNECOLOGY

## 2020-07-14 NOTE — PROGRESS NOTES
CC: Prenatal visit    Anayeli Fuentes is a 39 y.o.  at 19w0d.  Doing well.  Denies contractions, LOF, or VB.  Reports +FM.    BP 92/66   Wt 67.6 kg (149 lb)   LMP 2020 (Exact Date)   BMI 21.38 kg/m²   Fetal Heart Rate: 150     Prelim US- EFW 264g, ELENO WNL, cephalic, placenta posterior w/o previa, anatomy WNL, BOY, cervix 3.92 cm     Problems (from 20 to present)     Problem Noted Resolved    Supervision of high risk pregnancy in second trimester 2020 by Fernanda Hancock MD No    Previous  delivery affecting pregnancy 2020 by Fernanda Hancock MD No    Overview Signed 2020  2:27 PM by Fernanda Hancock MD     Secondary to breech presentation  Undecided  vs RLTCS         History of gastric bypass 2020 by Fernanda Hancock MD No    Overview Signed 2020  2:27 PM by Fernanda Hancock MD     No glucola at 28 weeks; will check blood sugars x1 week         Multigravida of advanced maternal age in second trimester 2020 by Fernanda Hancock MD No    Overview Addendum 2020  1:24 PM by Fernanda Hancock MD     Counseled NIPT, declines             A/P: Anayeli Fuentes is a 39 y.o.  at 19w0d.  - RTC in 4 weeks  - Reviewed COVID-19 visitation policy  - Reviewed COVID-19 precautions     Diagnosis Plan   1. Supervision of high risk pregnancy in second trimester     2. Previous  delivery affecting pregnancy     3. History of gastric bypass     4. Multigravida of advanced maternal age in second trimester     5. 19 weeks gestation of pregnancy       Fernanda Hancock MD  2020  18:26

## 2020-07-20 DIAGNOSIS — Z36.3 ANTENATAL SCREENING FOR MALFORMATION USING ULTRASONICS: ICD-10-CM

## 2020-07-27 RX ORDER — FAMOTIDINE 20 MG/1
20 TABLET, FILM COATED ORAL 2 TIMES DAILY
Qty: 30 TABLET | Refills: 1 | Status: SHIPPED | OUTPATIENT
Start: 2020-07-27 | End: 2020-10-05 | Stop reason: SDUPTHER

## 2020-08-14 ENCOUNTER — ROUTINE PRENATAL (OUTPATIENT)
Dept: OBSTETRICS AND GYNECOLOGY | Facility: CLINIC | Age: 40
End: 2020-08-14

## 2020-08-14 VITALS — DIASTOLIC BLOOD PRESSURE: 68 MMHG | SYSTOLIC BLOOD PRESSURE: 110 MMHG | BODY MASS INDEX: 21.21 KG/M2 | WEIGHT: 147.8 LBS

## 2020-08-14 DIAGNOSIS — Z98.84 HISTORY OF GASTRIC BYPASS: ICD-10-CM

## 2020-08-14 DIAGNOSIS — Z3A.23 23 WEEKS GESTATION OF PREGNANCY: ICD-10-CM

## 2020-08-14 DIAGNOSIS — O34.219 PREVIOUS CESAREAN DELIVERY AFFECTING PREGNANCY: ICD-10-CM

## 2020-08-14 DIAGNOSIS — O09.92 SUPERVISION OF HIGH RISK PREGNANCY IN SECOND TRIMESTER: Primary | ICD-10-CM

## 2020-08-14 DIAGNOSIS — O09.522 MULTIGRAVIDA OF ADVANCED MATERNAL AGE IN SECOND TRIMESTER: ICD-10-CM

## 2020-08-14 PROCEDURE — 0502F SUBSEQUENT PRENATAL CARE: CPT | Performed by: OBSTETRICS & GYNECOLOGY

## 2020-08-14 NOTE — PROGRESS NOTES
CC: Prenatal visit    Anayeli Fuentes is a 39 y.o.  at 23w3d.  Doing well.  Denies contractions, LOF, or VB.  Reports good FM.    /68   Wt 67 kg (147 lb 12.8 oz)   LMP 2020 (Exact Date)   BMI 21.21 kg/m²   Fundal Height (cm): 19 cm  Fetal Heart Rate: 140     Problems (from 20 to present)     Problem Noted Resolved    Supervision of high risk pregnancy in second trimester 2020 by Fernanda Hancock MD No    Previous  delivery affecting pregnancy 2020 by Fernanda Hancock MD No    Overview Signed 2020  2:27 PM by Fernanda Hancock MD     Secondary to breech presentation  Undecided  vs RLTCS         History of gastric bypass 2020 by Fernanda Hancock MD No    Overview Signed 2020  2:27 PM by Fernanda Hancock MD     No glucola at 28 weeks; will check blood sugars x1 week         Multigravida of advanced maternal age in second trimester 2020 by Fernanda Hancock MD No    Overview Addendum 2020  1:24 PM by Fernanda Hancock MD     Counseled NIPT, declines             A/P: Anayeli Fuentes is a 39 y.o.  at 23w3d.  - RTC in 4 weeks   - 3T labs   - Tdap at next visit  - Reviewed COVID-19 visitation policy  - Reviewed COVID-19 precautions     Diagnosis Plan   1. Supervision of high risk pregnancy in second trimester  CBC (No Diff)    Glucose, Post 50 Gm Glucola   2. Previous  delivery affecting pregnancy     3. History of gastric bypass     4. Multigravida of advanced maternal age in second trimester     5. 23 weeks gestation of pregnancy       Fernanda Hancock MD  2020  10:07

## 2020-09-17 ENCOUNTER — ROUTINE PRENATAL (OUTPATIENT)
Dept: OBSTETRICS AND GYNECOLOGY | Facility: CLINIC | Age: 40
End: 2020-09-17

## 2020-09-17 ENCOUNTER — LAB (OUTPATIENT)
Dept: LAB | Facility: HOSPITAL | Age: 40
End: 2020-09-17

## 2020-09-17 VITALS — BODY MASS INDEX: 21.67 KG/M2 | DIASTOLIC BLOOD PRESSURE: 64 MMHG | SYSTOLIC BLOOD PRESSURE: 108 MMHG | WEIGHT: 151 LBS

## 2020-09-17 DIAGNOSIS — Z3A.28 28 WEEKS GESTATION OF PREGNANCY: ICD-10-CM

## 2020-09-17 DIAGNOSIS — Z23 NEED FOR TDAP VACCINATION: ICD-10-CM

## 2020-09-17 DIAGNOSIS — O09.92 SUPERVISION OF HIGH RISK PREGNANCY IN SECOND TRIMESTER: ICD-10-CM

## 2020-09-17 DIAGNOSIS — O09.93 SUPERVISION OF HIGH RISK PREGNANCY IN THIRD TRIMESTER: Primary | ICD-10-CM

## 2020-09-17 DIAGNOSIS — O34.219 PREVIOUS CESAREAN DELIVERY AFFECTING PREGNANCY: ICD-10-CM

## 2020-09-17 DIAGNOSIS — O09.523 MULTIGRAVIDA OF ADVANCED MATERNAL AGE IN THIRD TRIMESTER: ICD-10-CM

## 2020-09-17 DIAGNOSIS — Z98.84 HISTORY OF GASTRIC BYPASS: ICD-10-CM

## 2020-09-17 LAB
DEPRECATED RDW RBC AUTO: 40 FL (ref 37–54)
ERYTHROCYTE [DISTWIDTH] IN BLOOD BY AUTOMATED COUNT: 13.6 % (ref 12.3–15.4)
GLUCOSE 1H P 100 G GLC PO SERPL-MCNC: 142 MG/DL (ref 60–140)
HCT VFR BLD AUTO: 31 % (ref 34–46.6)
HGB BLD-MCNC: 9.9 G/DL (ref 12–15.9)
MCH RBC QN AUTO: 26.1 PG (ref 26.6–33)
MCHC RBC AUTO-ENTMCNC: 31.9 G/DL (ref 31.5–35.7)
MCV RBC AUTO: 81.8 FL (ref 79–97)
PLATELET # BLD AUTO: 406 10*3/MM3 (ref 140–450)
PMV BLD AUTO: 9.7 FL (ref 6–12)
RBC # BLD AUTO: 3.79 10*6/MM3 (ref 3.77–5.28)
WBC # BLD AUTO: 12.52 10*3/MM3 (ref 3.4–10.8)

## 2020-09-17 PROCEDURE — 90715 TDAP VACCINE 7 YRS/> IM: CPT | Performed by: OBSTETRICS & GYNECOLOGY

## 2020-09-17 PROCEDURE — 36415 COLL VENOUS BLD VENIPUNCTURE: CPT

## 2020-09-17 PROCEDURE — 82950 GLUCOSE TEST: CPT

## 2020-09-17 PROCEDURE — 90471 IMMUNIZATION ADMIN: CPT | Performed by: OBSTETRICS & GYNECOLOGY

## 2020-09-17 PROCEDURE — 0502F SUBSEQUENT PRENATAL CARE: CPT | Performed by: OBSTETRICS & GYNECOLOGY

## 2020-09-17 PROCEDURE — 85027 COMPLETE CBC AUTOMATED: CPT

## 2020-09-17 NOTE — PROGRESS NOTES
CC: Prenatal visit    Anayeli Fuentes is a 39 y.o.  at 28w2d.  Doing well.  Denies contractions, LOF, or VB.  Reports good FM.    /64   Wt 68.5 kg (151 lb)   LMP 2020 (Exact Date)   BMI 21.67 kg/m²   Fundal Height (cm): 24 cm  Fetal Heart Rate: 137     Problems (from 20 to present)     Problem Noted Resolved    Supervision of high risk pregnancy in third trimester 2020 by Fernanda Hancock MD No    Previous  delivery affecting pregnancy 2020 by Fernanda Hancock MD No    Overview Signed 2020  2:27 PM by Fernanda Hancock MD     Secondary to breech presentation  Undecided  vs RLTCS         History of gastric bypass 2020 by Fernanda Hancock MD No    Overview Signed 2020  2:27 PM by Fernanda Hancock MD     No glucola at 28 weeks; will check blood sugars x1 week         Multigravida of advanced maternal age in third trimester 2020 by Fernanda Hancock MD No    Overview Addendum 2020  1:24 PM by Fernanda Hancock MD     Counseled NIPT, declines             A/P: Anayeli Fuentes is a 39 y.o.  at 28w2d.  - RTC in 2 weeks  - Reviewed COVID-19 visitation policy  - Reviewed COVID-19 precautions     Diagnosis Plan   1. Supervision of high risk pregnancy in third trimester     2. Previous  delivery affecting pregnancy     3. History of gastric bypass     4. Multigravida of advanced maternal age in third trimester     5. Need for Tdap vaccination  Tdap Vaccine Greater Than or Equal To 8yo IM   6. 28 weeks gestation of pregnancy       Fernanda Hancock MD  2020  08:51 CDT

## 2020-09-18 ENCOUNTER — TELEPHONE (OUTPATIENT)
Dept: OBSTETRICS AND GYNECOLOGY | Facility: CLINIC | Age: 40
End: 2020-09-18

## 2020-09-18 DIAGNOSIS — O99.810 ABNORMAL GLUCOSE AFFECTING PREGNANCY: Primary | ICD-10-CM

## 2020-09-18 NOTE — TELEPHONE ENCOUNTER
----- Message from Fernanda Hancock MD sent at 9/17/2020  7:31 PM CDT -----  Please let her know that she is anemic with Hgb 9.9; she can either continue the iron and recheck counts in 1 month or do IV iron.  Because of her gastric bypass, she may not be absorbing the PO iron like she should.  She did fail the 1h glucola so she can either do the fasting 3h GTT or check her BS for 1 week.

## 2020-09-18 NOTE — TELEPHONE ENCOUNTER
Called notified patient of results she would like to do IV iron infusions and a 3 hour gtt. Orders placed and patient was transferred for scheduling.

## 2020-09-21 ENCOUNTER — LAB (OUTPATIENT)
Dept: LAB | Facility: HOSPITAL | Age: 40
End: 2020-09-21

## 2020-09-21 DIAGNOSIS — O99.810 ABNORMAL GLUCOSE AFFECTING PREGNANCY: ICD-10-CM

## 2020-09-21 PROBLEM — O99.019 ANTEPARTUM ANEMIA: Status: ACTIVE | Noted: 2020-09-21

## 2020-09-21 LAB
GLUCOSE P FAST SERPL-MCNC: 78 MG/DL (ref 65–94)
GTT GEST 2H PNL UR+SERPL: 120 MG/DL (ref 65–179)
GTT GEST 3H PNL SERPL: 39 MG/DL (ref 65–154)
GTT GEST 3H PNL SERPL: 65 MG/DL (ref 65–139)

## 2020-09-21 PROCEDURE — 82952 GTT-ADDED SAMPLES: CPT

## 2020-09-21 PROCEDURE — 82951 GLUCOSE TOLERANCE TEST (GTT): CPT

## 2020-09-21 PROCEDURE — 36415 COLL VENOUS BLD VENIPUNCTURE: CPT

## 2020-09-21 RX ORDER — SODIUM CHLORIDE 9 MG/ML
250 INJECTION, SOLUTION INTRAVENOUS ONCE
Status: CANCELLED | OUTPATIENT
Start: 2020-09-21

## 2020-09-22 ENCOUNTER — TELEPHONE (OUTPATIENT)
Dept: OBSTETRICS AND GYNECOLOGY | Facility: CLINIC | Age: 40
End: 2020-09-22

## 2020-09-22 NOTE — TELEPHONE ENCOUNTER
Spoke with patient regarding results, let her know he does not have GDM.  Patient verbalized understanding.  Also let us know she is scheduled for iron infusion on 09/23/20.

## 2020-09-22 NOTE — TELEPHONE ENCOUNTER
----- Message from Fernanda Hancock MD sent at 9/21/2020  8:48 PM CDT -----  Please let her know she does not have GDM.

## 2020-09-23 ENCOUNTER — APPOINTMENT (OUTPATIENT)
Dept: ONCOLOGY | Facility: HOSPITAL | Age: 40
End: 2020-09-23

## 2020-09-23 ENCOUNTER — INFUSION (OUTPATIENT)
Dept: ONCOLOGY | Facility: HOSPITAL | Age: 40
End: 2020-09-23

## 2020-09-23 VITALS
TEMPERATURE: 97.4 F | DIASTOLIC BLOOD PRESSURE: 59 MMHG | RESPIRATION RATE: 16 BRPM | SYSTOLIC BLOOD PRESSURE: 114 MMHG | HEART RATE: 90 BPM

## 2020-09-23 DIAGNOSIS — O99.019 ANTEPARTUM ANEMIA: Primary | ICD-10-CM

## 2020-09-23 PROCEDURE — 25010000002 FERRIC CARBOXYMALTOSE 750 MG/15ML SOLUTION 15 ML VIAL: Performed by: NURSE PRACTITIONER

## 2020-09-23 PROCEDURE — 96374 THER/PROPH/DIAG INJ IV PUSH: CPT | Performed by: NURSE PRACTITIONER

## 2020-09-23 RX ORDER — SODIUM CHLORIDE 9 MG/ML
250 INJECTION, SOLUTION INTRAVENOUS ONCE
Status: COMPLETED | OUTPATIENT
Start: 2020-09-23 | End: 2020-09-23

## 2020-09-23 RX ORDER — SODIUM CHLORIDE 9 MG/ML
250 INJECTION, SOLUTION INTRAVENOUS ONCE
Status: CANCELLED | OUTPATIENT
Start: 2020-09-30

## 2020-09-23 RX ADMIN — FERRIC CARBOXYMALTOSE INJECTION 750 MG: 50 INJECTION, SOLUTION INTRAVENOUS at 11:06

## 2020-09-23 RX ADMIN — SODIUM CHLORIDE 250 ML: 9 INJECTION, SOLUTION INTRAVENOUS at 11:06

## 2020-09-30 ENCOUNTER — APPOINTMENT (OUTPATIENT)
Dept: ONCOLOGY | Facility: HOSPITAL | Age: 40
End: 2020-09-30

## 2020-10-01 ENCOUNTER — INFUSION (OUTPATIENT)
Dept: ONCOLOGY | Facility: HOSPITAL | Age: 40
End: 2020-10-01

## 2020-10-01 ENCOUNTER — ROUTINE PRENATAL (OUTPATIENT)
Dept: OBSTETRICS AND GYNECOLOGY | Facility: CLINIC | Age: 40
End: 2020-10-01

## 2020-10-01 VITALS
HEART RATE: 69 BPM | DIASTOLIC BLOOD PRESSURE: 58 MMHG | TEMPERATURE: 96.6 F | SYSTOLIC BLOOD PRESSURE: 98 MMHG | RESPIRATION RATE: 16 BRPM

## 2020-10-01 VITALS — DIASTOLIC BLOOD PRESSURE: 72 MMHG | SYSTOLIC BLOOD PRESSURE: 108 MMHG | WEIGHT: 155.8 LBS | BODY MASS INDEX: 22.35 KG/M2

## 2020-10-01 DIAGNOSIS — Z98.84 HISTORY OF GASTRIC BYPASS: ICD-10-CM

## 2020-10-01 DIAGNOSIS — O09.523 MULTIGRAVIDA OF ADVANCED MATERNAL AGE IN THIRD TRIMESTER: ICD-10-CM

## 2020-10-01 DIAGNOSIS — Z3A.30 30 WEEKS GESTATION OF PREGNANCY: ICD-10-CM

## 2020-10-01 DIAGNOSIS — O34.219 PREVIOUS CESAREAN DELIVERY AFFECTING PREGNANCY: ICD-10-CM

## 2020-10-01 DIAGNOSIS — O09.93 SUPERVISION OF HIGH RISK PREGNANCY IN THIRD TRIMESTER: Primary | ICD-10-CM

## 2020-10-01 DIAGNOSIS — O99.019 ANTEPARTUM ANEMIA: Primary | ICD-10-CM

## 2020-10-01 PROCEDURE — 0502F SUBSEQUENT PRENATAL CARE: CPT | Performed by: OBSTETRICS & GYNECOLOGY

## 2020-10-01 PROCEDURE — 96374 THER/PROPH/DIAG INJ IV PUSH: CPT | Performed by: NURSE PRACTITIONER

## 2020-10-01 PROCEDURE — 25010000002 FERRIC CARBOXYMALTOSE 750 MG/15ML SOLUTION 15 ML VIAL: Performed by: NURSE PRACTITIONER

## 2020-10-01 RX ORDER — SODIUM CHLORIDE 9 MG/ML
250 INJECTION, SOLUTION INTRAVENOUS ONCE
Status: COMPLETED | OUTPATIENT
Start: 2020-10-01 | End: 2020-10-01

## 2020-10-01 RX ORDER — SODIUM CHLORIDE 9 MG/ML
250 INJECTION, SOLUTION INTRAVENOUS ONCE
Status: CANCELLED | OUTPATIENT
Start: 2020-10-01

## 2020-10-01 RX ADMIN — FERRIC CARBOXYMALTOSE INJECTION 750 MG: 50 INJECTION, SOLUTION INTRAVENOUS at 13:07

## 2020-10-01 RX ADMIN — SODIUM CHLORIDE 250 ML: 9 INJECTION, SOLUTION INTRAVENOUS at 12:41

## 2020-10-02 RX ORDER — LIDOCAINE HYDROCHLORIDE 10 MG/ML
5 INJECTION, SOLUTION EPIDURAL; INFILTRATION; INTRACAUDAL; PERINEURAL AS NEEDED
Status: CANCELLED | OUTPATIENT
Start: 2020-10-02

## 2020-10-02 RX ORDER — TRISODIUM CITRATE DIHYDRATE AND CITRIC ACID MONOHYDRATE 500; 334 MG/5ML; MG/5ML
30 SOLUTION ORAL ONCE
Status: CANCELLED | OUTPATIENT
Start: 2020-10-02 | End: 2020-10-02

## 2020-10-02 RX ORDER — SODIUM CHLORIDE 0.9 % (FLUSH) 0.9 %
3-10 SYRINGE (ML) INJECTION AS NEEDED
Status: CANCELLED | OUTPATIENT
Start: 2020-10-02

## 2020-10-02 RX ORDER — OXYTOCIN 10 [USP'U]/ML
650 INJECTION, SOLUTION INTRAMUSCULAR; INTRAVENOUS ONCE
Status: CANCELLED | OUTPATIENT
Start: 2020-10-02

## 2020-10-02 RX ORDER — SODIUM CHLORIDE 0.9 % (FLUSH) 0.9 %
3 SYRINGE (ML) INJECTION EVERY 12 HOURS SCHEDULED
Status: CANCELLED | OUTPATIENT
Start: 2020-10-02

## 2020-10-02 RX ORDER — ONDANSETRON 4 MG/1
4 TABLET, FILM COATED ORAL EVERY 6 HOURS PRN
Status: CANCELLED | OUTPATIENT
Start: 2020-10-02

## 2020-10-02 RX ORDER — ONDANSETRON 2 MG/ML
4 INJECTION INTRAMUSCULAR; INTRAVENOUS EVERY 6 HOURS PRN
Status: CANCELLED | OUTPATIENT
Start: 2020-10-02

## 2020-10-02 RX ORDER — OXYTOCIN 10 [USP'U]/ML
85 INJECTION, SOLUTION INTRAMUSCULAR; INTRAVENOUS ONCE
Status: CANCELLED | OUTPATIENT
Start: 2020-10-02

## 2020-10-02 NOTE — PROGRESS NOTES
CC: Prenatal visit    Anayeli Fuentes is a 39 y.o.  at 30w3d.  Doing well.  Denies contractions, LOF, or VB.  Reports good FM.  Decided that if he hasn't come by 40 weeks, she wants a RLTCS.  She is undecided if she wants a  or not.    /72   Wt 70.7 kg (155 lb 12.8 oz)   LMP 2020 (Exact Date)   BMI 22.35 kg/m²   Fundal Height (cm): 26 cm  Fetal Heart Rate: 140     Problems (from 20 to present)     Problem Noted Resolved    Supervision of high risk pregnancy in third trimester 2020 by Fernanda Hancock MD No    Previous  delivery affecting pregnancy 2020 by Fernanda Hancock MD No    Overview Signed 2020  2:27 PM by Fernanda Hancock MD     Secondary to breech presentation  Undecided  vs RLTCS         History of gastric bypass 2020 by Fernanda Hancock MD No    Overview Signed 2020  2:27 PM by Fernanda Hancock MD     No glucola at 28 weeks; will check blood sugars x1 week         Multigravida of advanced maternal age in third trimester 2020 by Fernanda Hancock MD No    Overview Addendum 2020  1:24 PM by Fernanda Hancock MD     Counseled NIPT, declines               A/P: Anayeli Fuentes is a 39 y.o.  at 30w3d.  - RTC in 2 weeks  -  consent signed.  Discussed the advantages of a successful vaginal delivery including decreased risks for hemorrhage, infection, shorter hospital stay after delivery, and a more rapid recovery. The risks of undergoing a TOLAC include uterine rupture (opening of the prior incision on the uterus), which can occur in about 1% to 2% of TOLAC in the setting of a previous low transverse uterine incision. In the event of uterine rupture, there is a risk of maternal injury that may require a transfusion, removal of the uterus or damage to nearby organs like bladder or bowel. In the event of a uterine rupture,  there is also a 10-25% risk of significant adverse fetal sequelae. Catastrophic rupture leading to  death or permanent injury to the  is rare, occurring less often than 1000 's. Also discussed the risk of elective repeat  section to include risk for bleeding with possible blood transfusion, infection, and injury to other organs such as bowel or bladder. She voiced understanding of the information given..  - Reviewed COVID-19 visitation policy  - Reviewed COVID-19 precautions     Diagnosis Plan   1. Supervision of high risk pregnancy in third trimester     2. Previous  delivery affecting pregnancy     3. History of gastric bypass     4. Multigravida of advanced maternal age in third trimester     5. 30 weeks gestation of pregnancy       Fernanda Hancock MD  10/2/2020  00:13 CDT

## 2020-10-05 RX ORDER — FAMOTIDINE 20 MG/1
20 TABLET, FILM COATED ORAL 2 TIMES DAILY
Qty: 30 TABLET | Refills: 1 | Status: SHIPPED | OUTPATIENT
Start: 2020-10-05 | End: 2020-10-15 | Stop reason: SDUPTHER

## 2020-10-15 RX ORDER — FAMOTIDINE 20 MG/1
20 TABLET, FILM COATED ORAL 2 TIMES DAILY
Qty: 30 TABLET | Refills: 12 | Status: SHIPPED | OUTPATIENT
Start: 2020-10-15 | End: 2021-01-04

## 2020-10-16 ENCOUNTER — ROUTINE PRENATAL (OUTPATIENT)
Dept: OBSTETRICS AND GYNECOLOGY | Facility: CLINIC | Age: 40
End: 2020-10-16

## 2020-10-16 VITALS — BODY MASS INDEX: 23.24 KG/M2 | SYSTOLIC BLOOD PRESSURE: 114 MMHG | DIASTOLIC BLOOD PRESSURE: 70 MMHG | WEIGHT: 162 LBS

## 2020-10-16 DIAGNOSIS — O09.93 SUPERVISION OF HIGH RISK PREGNANCY IN THIRD TRIMESTER: Primary | ICD-10-CM

## 2020-10-16 DIAGNOSIS — O99.013 ANEMIA DURING PREGNANCY IN THIRD TRIMESTER: ICD-10-CM

## 2020-10-16 DIAGNOSIS — O34.219 PREVIOUS CESAREAN DELIVERY AFFECTING PREGNANCY: ICD-10-CM

## 2020-10-16 DIAGNOSIS — Z98.84 HISTORY OF GASTRIC BYPASS: ICD-10-CM

## 2020-10-16 DIAGNOSIS — O09.523 MULTIGRAVIDA OF ADVANCED MATERNAL AGE IN THIRD TRIMESTER: ICD-10-CM

## 2020-10-16 DIAGNOSIS — Z3A.32 32 WEEKS GESTATION OF PREGNANCY: ICD-10-CM

## 2020-10-16 PROCEDURE — 0502F SUBSEQUENT PRENATAL CARE: CPT | Performed by: OBSTETRICS & GYNECOLOGY

## 2020-10-16 NOTE — PROGRESS NOTES
CC: Prenatal visit    Anayeli Fuentes is a 39 y.o.  at 32w3d.  Doing well.  Denies contractions, LOF, or VB.  Reports good FM.  Thinks that he may be transverse.  Completed IV iron infusions on 10/1.    /70   Wt 73.5 kg (162 lb)   LMP 2020 (Exact Date)   BMI 23.24 kg/m²   Fundal Height (cm): 32 cm  Fetal Heart Rate: 146   BSUS: cephalic     Problems (from 20 to present)     Problem Noted Resolved    Supervision of high risk pregnancy in third trimester 2020 by Fernanda Hancock MD No    Previous  delivery affecting pregnancy 2020 by Fernanda Hancock MD No    Overview Signed 2020  2:27 PM by Fernanda Hancock MD     Secondary to breech presentation  Undecided  vs RLTCS         History of gastric bypass 2020 by Fernanda Hancock MD No    Overview Signed 2020  2:27 PM by Fernanda Hancock MD     No glucola at 28 weeks; will check blood sugars x1 week         Multigravida of advanced maternal age in third trimester 2020 by Fernanda Hancock MD No    Overview Addendum 2020  1:24 PM by Fernanda Hancock MD     Counseled NIPT, declines             A/P: Anayeli Fuentes is a 39 y.o.  at 32w3d.  - RTC in 2 weeks w/ CBC at next visit (to see Dr. Gil due to scheduling) but see me in 2 weeks after that  - Reviewed COVID-19 visitation policy  - Reviewed COVID-19 precautions     Diagnosis Plan   1. Supervision of high risk pregnancy in third trimester     2. Previous  delivery affecting pregnancy     3. History of gastric bypass     4. Multigravida of advanced maternal age in third trimester     5. 32 weeks gestation of pregnancy       Fernanda Hancock MD  10/16/2020  10:54 CDT

## 2020-10-30 ENCOUNTER — ROUTINE PRENATAL (OUTPATIENT)
Dept: OBSTETRICS AND GYNECOLOGY | Facility: CLINIC | Age: 40
End: 2020-10-30

## 2020-10-30 ENCOUNTER — LAB (OUTPATIENT)
Dept: LAB | Facility: HOSPITAL | Age: 40
End: 2020-10-30

## 2020-10-30 VITALS — SYSTOLIC BLOOD PRESSURE: 92 MMHG | BODY MASS INDEX: 23.39 KG/M2 | DIASTOLIC BLOOD PRESSURE: 64 MMHG | WEIGHT: 163 LBS

## 2020-10-30 DIAGNOSIS — Z98.84 HISTORY OF GASTRIC BYPASS: ICD-10-CM

## 2020-10-30 DIAGNOSIS — O99.013 ANEMIA DURING PREGNANCY IN THIRD TRIMESTER: ICD-10-CM

## 2020-10-30 DIAGNOSIS — Z3A.34 34 WEEKS GESTATION OF PREGNANCY: Primary | ICD-10-CM

## 2020-10-30 DIAGNOSIS — O09.523 MULTIGRAVIDA OF ADVANCED MATERNAL AGE IN THIRD TRIMESTER: ICD-10-CM

## 2020-10-30 DIAGNOSIS — O34.219 PREVIOUS CESAREAN DELIVERY AFFECTING PREGNANCY: ICD-10-CM

## 2020-10-30 DIAGNOSIS — O09.93 SUPERVISION OF HIGH RISK PREGNANCY IN THIRD TRIMESTER: ICD-10-CM

## 2020-10-30 LAB
ANISOCYTOSIS BLD QL: NORMAL
DEPRECATED RDW RBC AUTO: ABNORMAL FL
ERYTHROCYTE [DISTWIDTH] IN BLOOD BY AUTOMATED COUNT: ABNORMAL %
HCT VFR BLD AUTO: 37.5 % (ref 34–46.6)
HGB BLD-MCNC: 12 G/DL (ref 12–15.9)
MCH RBC QN AUTO: 29.6 PG (ref 26.6–33)
MCHC RBC AUTO-ENTMCNC: 32 G/DL (ref 31.5–35.7)
MCV RBC AUTO: 92.6 FL (ref 79–97)
PLATELET # BLD AUTO: 258 10*3/MM3 (ref 140–450)
PMV BLD AUTO: 9.3 FL (ref 6–12)
RBC # BLD AUTO: 4.05 10*6/MM3 (ref 3.77–5.28)
SMALL PLATELETS BLD QL SMEAR: ADEQUATE
WBC # BLD AUTO: 14.52 10*3/MM3 (ref 3.4–10.8)
WBC MORPH BLD: NORMAL

## 2020-10-30 PROCEDURE — 36415 COLL VENOUS BLD VENIPUNCTURE: CPT

## 2020-10-30 PROCEDURE — 0502F SUBSEQUENT PRENATAL CARE: CPT | Performed by: OBSTETRICS & GYNECOLOGY

## 2020-10-30 PROCEDURE — 85007 BL SMEAR W/DIFF WBC COUNT: CPT

## 2020-10-30 PROCEDURE — 85027 COMPLETE CBC AUTOMATED: CPT

## 2020-10-30 RX ORDER — CALCIUM CARBONATE 200(500)MG
1 TABLET,CHEWABLE ORAL AS NEEDED
COMMUNITY
End: 2021-01-04

## 2020-10-30 RX ORDER — UREA 10 %
1 LOTION (ML) TOPICAL DAILY
COMMUNITY
Start: 2015-01-12 | End: 2020-10-30 | Stop reason: SDUPTHER

## 2020-10-30 RX ORDER — MONTELUKAST SODIUM 10 MG/1
1 TABLET ORAL DAILY
COMMUNITY
Start: 2015-01-09

## 2020-10-31 PROBLEM — Z3A.34 34 WEEKS GESTATION OF PREGNANCY: Status: ACTIVE | Noted: 2020-10-31

## 2020-10-31 NOTE — PROGRESS NOTES
"CC: Prenatal visit    Anayeli Fuentes is a 40 y.o.  at 34w4d.  Doing well.  Denies contractions, LOF, or VB.  Reports good FM.    BP 92/64   Wt 73.9 kg (163 lb)   LMP 2020 (Exact Date)   BMI 23.39 kg/m²   SVE: Not done  Fundal Height (cm): 34 cm  Fetal Heart Rate: 150     Problems (from 20 to present)     Problem Noted Resolved    Anemia during pregnancy in third trimester 10/16/2020 by Fernanda Hancock MD No    Overview Addendum 10/30/2020 11:07 AM by Fernanda Hancock MD     S/p IV iron x2  Repeat Hgb 12.0         Supervision of high risk pregnancy in third trimester 2020 by Fernanda Hancock MD No    Overview Signed 10/30/2020 11:09 AM by Fernanda Hancock MD     A pos / Rubella immune / GBS unk  Dating: LMP = 1TUS (7wk)  Genetics: Declined  Tdap:   Flu: Received at work  Anatomy: WNL, BOY \"Pablo Andrew\"  1h Glucola: 142; 3h GTT WNL  H&H/Plts:   Lab Results   Component Value Date    HGB 12.0 10/30/2020    HCT 37.5 10/30/2020     10/30/2020   Breast/BC undecided         Previous  delivery affecting pregnancy 2020 by Fernanda Hancock MD No    Overview Addendum 10/30/2020 11:08 AM by Fernanda Hancock MD     Secondary to breech presentation  Undecided  vs RLTCS   consent signed 10/1  Scheduled for ERLTCS at 40wks on          History of gastric bypass 2020 by Fernanda Hancock MD No    Multigravida of advanced maternal age in third trimester 2020 by Fernanda Hancock MD No    Overview Addendum 2020  1:24 PM by Fernanda Hancock MD     Counseled NIPT, declines               A/P: Anayeli Fuentes is a 40 y.o.  at 34w4d.  - RTC in 1 weeks  - Reviewed COVID-19 visitation policy  - Reviewed COVID-19 precautions     Diagnosis Plan   1. 34 weeks gestation of pregnancy     2. Anemia during pregnancy in third trimester   "   3. Supervision of high risk pregnancy in third trimester     4. Previous  delivery affecting pregnancy   Tolac  counseling again done.  Wants to keep her options open based on clinical course which I think is reasonable   5. History of gastric bypass     6. Multigravida of advanced maternal age in third trimester       Andrew Gil MD  10/31/2020  11:53 CDT

## 2020-11-10 ENCOUNTER — ROUTINE PRENATAL (OUTPATIENT)
Dept: OBSTETRICS AND GYNECOLOGY | Facility: CLINIC | Age: 40
End: 2020-11-10

## 2020-11-10 VITALS — SYSTOLIC BLOOD PRESSURE: 108 MMHG | DIASTOLIC BLOOD PRESSURE: 64 MMHG | WEIGHT: 161 LBS | BODY MASS INDEX: 23.1 KG/M2

## 2020-11-10 DIAGNOSIS — O34.219 PREVIOUS CESAREAN DELIVERY AFFECTING PREGNANCY: ICD-10-CM

## 2020-11-10 DIAGNOSIS — O09.93 SUPERVISION OF HIGH RISK PREGNANCY IN THIRD TRIMESTER: Primary | ICD-10-CM

## 2020-11-10 DIAGNOSIS — Z98.84 HISTORY OF GASTRIC BYPASS: ICD-10-CM

## 2020-11-10 DIAGNOSIS — O09.523 MULTIGRAVIDA OF ADVANCED MATERNAL AGE IN THIRD TRIMESTER: ICD-10-CM

## 2020-11-10 DIAGNOSIS — Z3A.36 36 WEEKS GESTATION OF PREGNANCY: ICD-10-CM

## 2020-11-10 DIAGNOSIS — O26.849 UTERINE SIZE DATE DISCREPANCY PREGNANCY: ICD-10-CM

## 2020-11-10 DIAGNOSIS — O99.013 ANEMIA DURING PREGNANCY IN THIRD TRIMESTER: ICD-10-CM

## 2020-11-10 PROCEDURE — 0502F SUBSEQUENT PRENATAL CARE: CPT | Performed by: OBSTETRICS & GYNECOLOGY

## 2020-11-10 PROCEDURE — 87653 STREP B DNA AMP PROBE: CPT | Performed by: OBSTETRICS & GYNECOLOGY

## 2020-11-11 LAB — GROUP B STREP, DNA: NEGATIVE

## 2020-11-13 PROBLEM — Z3A.34 34 WEEKS GESTATION OF PREGNANCY: Status: RESOLVED | Noted: 2020-10-31 | Resolved: 2020-11-13

## 2020-11-13 NOTE — PROGRESS NOTES
"CC: Prenatal visit    Anayeli Fuentes is a 40 y.o.  at 36w0d.  Doing well.  Denies contractions, LOF, or VB.  Reports good FM.    /64   Wt 73 kg (161 lb)   LMP 2020 (Exact Date)   Breastfeeding No   BMI 23.10 kg/m²   SVE: Declined  Fundal Height (cm): 31 cm  Fetal Heart Rate: 145     Problems (from 20 to present)     Problem Noted Resolved    Anemia during pregnancy in third trimester 10/16/2020 by Fernanda Hancock MD No    Overview Addendum 10/30/2020 11:07 AM by Fernanda Hancock MD     S/p IV iron x2  Repeat Hgb 12.0         Supervision of high risk pregnancy in third trimester 2020 by Fernanda Hancock MD No    Overview Addendum 2020  8:59 AM by Fernanda Hancock MD     A pos / Rubella immune / GBS neg  Dating: LMP = 1TUS (7wk)  Genetics: Declined  Tdap:   Flu: Received at work  Anatomy: WNL, BOY \"Pablo Andrew\"  1h Glucola: 142; 3h GTT WNL  H&H/Plts:   Lab Results   Component Value Date    HGB 12.0 10/30/2020    HCT 37.5 10/30/2020     10/30/2020   Breast/BC undecided         Previous  delivery affecting pregnancy 2020 by Fernanda Hancock MD No    Overview Addendum 10/30/2020 11:08 AM by Fernanda Hancock MD     Secondary to breech presentation  Undecided  vs RLTCS   consent signed 10/1  Scheduled for ERLTCS at 40wks on          History of gastric bypass 2020 by Fernanda Hancock MD No    Multigravida of advanced maternal age in third trimester 2020 by Fernanda Hancock MD No    Overview Addendum 2020  1:24 PM by Fernanda Hancock MD     Counseled NIPT, declines             A/P: Anayeli Fuentes is a 40 y.o.  at 36w0d.  - RTC in 1 weeks  - Reviewed COVID-19 visitation policy  - Reviewed COVID-19 precautions     Diagnosis Plan   1. Supervision of high risk pregnancy in third trimester     2. " Anemia during pregnancy in third trimester     3. Previous  delivery affecting pregnancy     4. History of gastric bypass     5. Multigravida of advanced maternal age in third trimester     6. 36 weeks gestation of pregnancy  Group B Strep (Molecular) - Swab, Vaginal/Rectum   7. Uterine size date discrepancy pregnancy  US Ob Follow Up Transabdominal Approach     Fernanda Hancock MD  2020  00:15 CST

## 2020-11-19 ENCOUNTER — ROUTINE PRENATAL (OUTPATIENT)
Dept: OBSTETRICS AND GYNECOLOGY | Facility: CLINIC | Age: 40
End: 2020-11-19

## 2020-11-19 VITALS — BODY MASS INDEX: 23.39 KG/M2 | SYSTOLIC BLOOD PRESSURE: 100 MMHG | WEIGHT: 163 LBS | DIASTOLIC BLOOD PRESSURE: 60 MMHG

## 2020-11-19 DIAGNOSIS — O36.5930 POOR FETAL GROWTH AFFECTING MANAGEMENT OF MOTHER IN THIRD TRIMESTER, SINGLE OR UNSPECIFIED FETUS: ICD-10-CM

## 2020-11-19 DIAGNOSIS — O09.93 SUPERVISION OF HIGH RISK PREGNANCY IN THIRD TRIMESTER: Primary | ICD-10-CM

## 2020-11-19 DIAGNOSIS — O34.219 PREVIOUS CESAREAN DELIVERY AFFECTING PREGNANCY: ICD-10-CM

## 2020-11-19 DIAGNOSIS — Z98.84 HISTORY OF GASTRIC BYPASS: ICD-10-CM

## 2020-11-19 DIAGNOSIS — O99.013 ANEMIA DURING PREGNANCY IN THIRD TRIMESTER: ICD-10-CM

## 2020-11-19 DIAGNOSIS — Z3A.37 37 WEEKS GESTATION OF PREGNANCY: ICD-10-CM

## 2020-11-19 DIAGNOSIS — O09.523 MULTIGRAVIDA OF ADVANCED MATERNAL AGE IN THIRD TRIMESTER: ICD-10-CM

## 2020-11-19 PROCEDURE — 0502F SUBSEQUENT PRENATAL CARE: CPT | Performed by: OBSTETRICS & GYNECOLOGY

## 2020-11-20 ENCOUNTER — ANESTHESIA EVENT (OUTPATIENT)
Dept: LABOR AND DELIVERY | Facility: HOSPITAL | Age: 40
End: 2020-11-20

## 2020-11-20 ENCOUNTER — HOSPITAL ENCOUNTER (INPATIENT)
Facility: HOSPITAL | Age: 40
LOS: 2 days | Discharge: HOME | End: 2020-11-22
Attending: OBSTETRICS & GYNECOLOGY | Admitting: OBSTETRICS & GYNECOLOGY

## 2020-11-20 DIAGNOSIS — O34.219 PREVIOUS CESAREAN DELIVERY AFFECTING PREGNANCY: ICD-10-CM

## 2020-11-20 DIAGNOSIS — O36.5931 IUGR (INTRAUTERINE GROWTH RESTRICTION) AFFECTING CARE OF MOTHER, THIRD TRIMESTER, FETUS 1: ICD-10-CM

## 2020-11-20 PROBLEM — O36.5930 POOR FETAL GROWTH AFFECTING MANAGEMENT OF MOTHER IN THIRD TRIMESTER: Status: ACTIVE | Noted: 2020-11-20

## 2020-11-20 LAB
ABO GROUP BLD: NORMAL
AMPHET+METHAMPHET UR QL: NEGATIVE
AMPHETAMINES UR QL: NEGATIVE
BARBITURATES UR QL SCN: NEGATIVE
BENZODIAZ UR QL SCN: NEGATIVE
BLD GP AB SCN SERPL QL: NEGATIVE
BUPRENORPHINE SERPL-MCNC: NEGATIVE NG/ML
CANNABINOIDS SERPL QL: NEGATIVE
COCAINE UR QL: NEGATIVE
DEPRECATED RDW RBC AUTO: ABNORMAL FL
ERYTHROCYTE [DISTWIDTH] IN BLOOD BY AUTOMATED COUNT: ABNORMAL %
HCT VFR BLD AUTO: 40.9 % (ref 34–46.6)
HGB BLD-MCNC: 13.5 G/DL (ref 12–15.9)
Lab: NORMAL
MCH RBC QN AUTO: 30.8 PG (ref 26.6–33)
MCHC RBC AUTO-ENTMCNC: 33 G/DL (ref 31.5–35.7)
MCV RBC AUTO: 93.2 FL (ref 79–97)
METHADONE UR QL SCN: NEGATIVE
OPIATES UR QL: NEGATIVE
OXYCODONE UR QL SCN: NEGATIVE
PCP UR QL SCN: NEGATIVE
PLATELET # BLD AUTO: 282 10*3/MM3 (ref 140–450)
PMV BLD AUTO: 9.7 FL (ref 6–12)
PROPOXYPH UR QL: NEGATIVE
RBC # BLD AUTO: 4.39 10*6/MM3 (ref 3.77–5.28)
RH BLD: POSITIVE
T&S EXPIRATION DATE: NORMAL
TRICYCLICS UR QL SCN: NEGATIVE
WBC # BLD AUTO: 11.75 10*3/MM3 (ref 3.4–10.8)

## 2020-11-20 PROCEDURE — 85027 COMPLETE CBC AUTOMATED: CPT | Performed by: OBSTETRICS & GYNECOLOGY

## 2020-11-20 PROCEDURE — 25010000002 MORPHINE PER 10 MG: Performed by: NURSE ANESTHETIST, CERTIFIED REGISTERED

## 2020-11-20 PROCEDURE — 94799 UNLISTED PULMONARY SVC/PX: CPT

## 2020-11-20 PROCEDURE — 86900 BLOOD TYPING SEROLOGIC ABO: CPT | Performed by: OBSTETRICS & GYNECOLOGY

## 2020-11-20 PROCEDURE — 80306 DRUG TEST PRSMV INSTRMNT: CPT | Performed by: OBSTETRICS & GYNECOLOGY

## 2020-11-20 PROCEDURE — 86850 RBC ANTIBODY SCREEN: CPT | Performed by: OBSTETRICS & GYNECOLOGY

## 2020-11-20 PROCEDURE — 59510 CESAREAN DELIVERY: CPT | Performed by: OBSTETRICS & GYNECOLOGY

## 2020-11-20 PROCEDURE — 25010000002 ONDANSETRON PER 1 MG: Performed by: NURSE ANESTHETIST, CERTIFIED REGISTERED

## 2020-11-20 PROCEDURE — 44604 SUTURE LARGE INTESTINE: CPT | Performed by: SURGERY

## 2020-11-20 PROCEDURE — 51703 INSERT BLADDER CATH COMPLEX: CPT

## 2020-11-20 PROCEDURE — 88307 TISSUE EXAM BY PATHOLOGIST: CPT

## 2020-11-20 PROCEDURE — 86901 BLOOD TYPING SEROLOGIC RH(D): CPT | Performed by: OBSTETRICS & GYNECOLOGY

## 2020-11-20 PROCEDURE — 25010000002 GENTAMICIN PER 80 MG: Performed by: OBSTETRICS & GYNECOLOGY

## 2020-11-20 PROCEDURE — 25010000002 PHENYLEPHRINE PER 1 ML: Performed by: NURSE ANESTHETIST, CERTIFIED REGISTERED

## 2020-11-20 PROCEDURE — 0DQE0ZZ REPAIR LARGE INTESTINE, OPEN APPROACH: ICD-10-PCS | Performed by: OBSTETRICS & GYNECOLOGY

## 2020-11-20 RX ORDER — OXYTOCIN/0.9 % SODIUM CHLORIDE 30/500 ML
85 PLASTIC BAG, INJECTION (ML) INTRAVENOUS ONCE
Status: DISCONTINUED | OUTPATIENT
Start: 2020-11-20 | End: 2020-11-20

## 2020-11-20 RX ORDER — LIDOCAINE HYDROCHLORIDE 10 MG/ML
5 INJECTION, SOLUTION EPIDURAL; INFILTRATION; INTRACAUDAL; PERINEURAL AS NEEDED
Status: DISCONTINUED | OUTPATIENT
Start: 2020-11-20 | End: 2020-11-20 | Stop reason: HOSPADM

## 2020-11-20 RX ORDER — OXYCODONE HYDROCHLORIDE 5 MG/1
5 TABLET ORAL EVERY 4 HOURS PRN
Status: DISCONTINUED | OUTPATIENT
Start: 2020-11-20 | End: 2020-11-22 | Stop reason: HOSPADM

## 2020-11-20 RX ORDER — NALBUPHINE HCL 10 MG/ML
2 AMPUL (ML) INJECTION
Status: DISCONTINUED | OUTPATIENT
Start: 2020-11-20 | End: 2020-11-20

## 2020-11-20 RX ORDER — OXYTOCIN/0.9 % SODIUM CHLORIDE 30/500 ML
650 PLASTIC BAG, INJECTION (ML) INTRAVENOUS ONCE
Status: DISCONTINUED | OUTPATIENT
Start: 2020-11-20 | End: 2020-11-20

## 2020-11-20 RX ORDER — SODIUM CHLORIDE 0.9 % (FLUSH) 0.9 %
3-10 SYRINGE (ML) INJECTION AS NEEDED
Status: DISCONTINUED | OUTPATIENT
Start: 2020-11-20 | End: 2020-11-20 | Stop reason: HOSPADM

## 2020-11-20 RX ORDER — ONDANSETRON 2 MG/ML
4 INJECTION INTRAMUSCULAR; INTRAVENOUS EVERY 6 HOURS PRN
Status: DISCONTINUED | OUTPATIENT
Start: 2020-11-20 | End: 2020-11-20 | Stop reason: HOSPADM

## 2020-11-20 RX ORDER — ONDANSETRON 4 MG/1
4 TABLET, FILM COATED ORAL EVERY 8 HOURS PRN
Status: DISCONTINUED | OUTPATIENT
Start: 2020-11-20 | End: 2020-11-22 | Stop reason: HOSPADM

## 2020-11-20 RX ORDER — DIPHENHYDRAMINE HCL 25 MG
25 CAPSULE ORAL EVERY 4 HOURS PRN
Status: DISCONTINUED | OUTPATIENT
Start: 2020-11-20 | End: 2020-11-22 | Stop reason: HOSPADM

## 2020-11-20 RX ORDER — ONDANSETRON 2 MG/ML
4 INJECTION INTRAMUSCULAR; INTRAVENOUS ONCE AS NEEDED
Status: DISCONTINUED | OUTPATIENT
Start: 2020-11-20 | End: 2020-11-20

## 2020-11-20 RX ORDER — TRISODIUM CITRATE DIHYDRATE AND CITRIC ACID MONOHYDRATE 500; 334 MG/5ML; MG/5ML
30 SOLUTION ORAL ONCE
Status: COMPLETED | OUTPATIENT
Start: 2020-11-20 | End: 2020-11-20

## 2020-11-20 RX ORDER — DIPHENHYDRAMINE HYDROCHLORIDE 50 MG/ML
25 INJECTION INTRAMUSCULAR; INTRAVENOUS EVERY 4 HOURS PRN
Status: DISCONTINUED | OUTPATIENT
Start: 2020-11-20 | End: 2020-11-22 | Stop reason: HOSPADM

## 2020-11-20 RX ORDER — OXYTOCIN/0.9 % SODIUM CHLORIDE 30/500 ML
85 PLASTIC BAG, INJECTION (ML) INTRAVENOUS ONCE
Status: COMPLETED | OUTPATIENT
Start: 2020-11-20 | End: 2020-11-20

## 2020-11-20 RX ORDER — DIPHENHYDRAMINE HCL 25 MG
25 CAPSULE ORAL EVERY 4 HOURS PRN
Status: DISCONTINUED | OUTPATIENT
Start: 2020-11-20 | End: 2020-11-20

## 2020-11-20 RX ORDER — OXYTOCIN/0.9 % SODIUM CHLORIDE 30/500 ML
PLASTIC BAG, INJECTION (ML) INTRAVENOUS
Status: COMPLETED
Start: 2020-11-20 | End: 2020-11-20

## 2020-11-20 RX ORDER — SIMETHICONE 80 MG
80 TABLET,CHEWABLE ORAL 4 TIMES DAILY
Status: DISCONTINUED | OUTPATIENT
Start: 2020-11-20 | End: 2020-11-22 | Stop reason: HOSPADM

## 2020-11-20 RX ORDER — DOCUSATE SODIUM 100 MG/1
100 CAPSULE, LIQUID FILLED ORAL 2 TIMES DAILY PRN
Status: DISCONTINUED | OUTPATIENT
Start: 2020-11-20 | End: 2020-11-22 | Stop reason: HOSPADM

## 2020-11-20 RX ORDER — BUPIVACAINE HYDROCHLORIDE 7.5 MG/ML
INJECTION, SOLUTION EPIDURAL; RETROBULBAR
Status: COMPLETED | OUTPATIENT
Start: 2020-11-20 | End: 2020-11-20

## 2020-11-20 RX ORDER — CALCIUM CARBONATE 200(500)MG
2 TABLET,CHEWABLE ORAL EVERY 6 HOURS PRN
Status: DISCONTINUED | OUTPATIENT
Start: 2020-11-20 | End: 2020-11-22 | Stop reason: HOSPADM

## 2020-11-20 RX ORDER — ACETAMINOPHEN 500 MG
1000 TABLET ORAL EVERY 6 HOURS
Status: DISCONTINUED | OUTPATIENT
Start: 2020-11-20 | End: 2020-11-22 | Stop reason: HOSPADM

## 2020-11-20 RX ORDER — PRENATAL VIT/IRON FUM/FOLIC AC 27MG-0.8MG
1 TABLET ORAL DAILY
Status: DISCONTINUED | OUTPATIENT
Start: 2020-11-20 | End: 2020-11-22 | Stop reason: HOSPADM

## 2020-11-20 RX ORDER — SCOLOPAMINE TRANSDERMAL SYSTEM 1 MG/1
1 PATCH, EXTENDED RELEASE TRANSDERMAL
Status: DISCONTINUED | OUTPATIENT
Start: 2020-11-20 | End: 2020-11-22 | Stop reason: HOSPADM

## 2020-11-20 RX ORDER — ONDANSETRON 2 MG/ML
4 INJECTION INTRAMUSCULAR; INTRAVENOUS EVERY 6 HOURS PRN
Status: DISCONTINUED | OUTPATIENT
Start: 2020-11-20 | End: 2020-11-22 | Stop reason: HOSPADM

## 2020-11-20 RX ORDER — MORPHINE SULFATE 1 MG/ML
INJECTION, SOLUTION EPIDURAL; INTRATHECAL; INTRAVENOUS AS NEEDED
Status: DISCONTINUED | OUTPATIENT
Start: 2020-11-20 | End: 2020-11-20 | Stop reason: SURG

## 2020-11-20 RX ORDER — OXYTOCIN/0.9 % SODIUM CHLORIDE 30/500 ML
PLASTIC BAG, INJECTION (ML) INTRAVENOUS CONTINUOUS PRN
Status: DISCONTINUED | OUTPATIENT
Start: 2020-11-20 | End: 2020-11-20 | Stop reason: SURG

## 2020-11-20 RX ORDER — NALOXONE HCL 0.4 MG/ML
0.2 VIAL (ML) INJECTION
Status: DISCONTINUED | OUTPATIENT
Start: 2020-11-20 | End: 2020-11-22 | Stop reason: HOSPADM

## 2020-11-20 RX ORDER — LANOLIN 100 %
OINTMENT (GRAM) TOPICAL
Status: DISCONTINUED | OUTPATIENT
Start: 2020-11-20 | End: 2020-11-22 | Stop reason: HOSPADM

## 2020-11-20 RX ORDER — ONDANSETRON 2 MG/ML
INJECTION INTRAMUSCULAR; INTRAVENOUS AS NEEDED
Status: DISCONTINUED | OUTPATIENT
Start: 2020-11-20 | End: 2020-11-20 | Stop reason: SURG

## 2020-11-20 RX ORDER — CLINDAMYCIN PHOSPHATE 900 MG/50ML
900 INJECTION INTRAVENOUS ONCE
Status: COMPLETED | OUTPATIENT
Start: 2020-11-20 | End: 2020-11-20

## 2020-11-20 RX ORDER — SODIUM CHLORIDE, SODIUM LACTATE, POTASSIUM CHLORIDE, CALCIUM CHLORIDE 600; 310; 30; 20 MG/100ML; MG/100ML; MG/100ML; MG/100ML
125 INJECTION, SOLUTION INTRAVENOUS CONTINUOUS
Status: DISCONTINUED | OUTPATIENT
Start: 2020-11-20 | End: 2020-11-20 | Stop reason: HOSPADM

## 2020-11-20 RX ORDER — ONDANSETRON 4 MG/1
4 TABLET, FILM COATED ORAL EVERY 6 HOURS PRN
Status: DISCONTINUED | OUTPATIENT
Start: 2020-11-20 | End: 2020-11-20 | Stop reason: HOSPADM

## 2020-11-20 RX ORDER — SODIUM CHLORIDE 0.9 % (FLUSH) 0.9 %
3 SYRINGE (ML) INJECTION EVERY 12 HOURS SCHEDULED
Status: DISCONTINUED | OUTPATIENT
Start: 2020-11-20 | End: 2020-11-20 | Stop reason: HOSPADM

## 2020-11-20 RX ORDER — EPHEDRINE SULFATE 50 MG/ML
INJECTION, SOLUTION INTRAVENOUS AS NEEDED
Status: DISCONTINUED | OUTPATIENT
Start: 2020-11-20 | End: 2020-11-20 | Stop reason: SURG

## 2020-11-20 RX ORDER — POLYETHYLENE GLYCOL 3350 17 G/17G
17 POWDER, FOR SOLUTION ORAL DAILY
Status: DISCONTINUED | OUTPATIENT
Start: 2020-11-20 | End: 2020-11-22 | Stop reason: HOSPADM

## 2020-11-20 RX ORDER — HYDROCORTISONE 25 MG/G
CREAM TOPICAL 3 TIMES DAILY PRN
Status: DISCONTINUED | OUTPATIENT
Start: 2020-11-20 | End: 2020-11-22 | Stop reason: HOSPADM

## 2020-11-20 RX ORDER — MONTELUKAST SODIUM 10 MG/1
10 TABLET ORAL NIGHTLY
Status: DISCONTINUED | OUTPATIENT
Start: 2020-11-20 | End: 2020-11-22 | Stop reason: HOSPADM

## 2020-11-20 RX ORDER — OXYTOCIN/0.9 % SODIUM CHLORIDE 30/500 ML
650 PLASTIC BAG, INJECTION (ML) INTRAVENOUS ONCE
Status: DISCONTINUED | OUTPATIENT
Start: 2020-11-20 | End: 2020-11-20 | Stop reason: HOSPADM

## 2020-11-20 RX ADMIN — Medication: at 11:40

## 2020-11-20 RX ADMIN — EPHEDRINE SULFATE 5 MG: 50 INJECTION INTRAVENOUS at 07:46

## 2020-11-20 RX ADMIN — EPHEDRINE SULFATE 5 MG: 50 INJECTION INTRAVENOUS at 07:56

## 2020-11-20 RX ADMIN — PHENYLEPHRINE HYDROCHLORIDE 100 MCG: 10 INJECTION INTRAVENOUS at 07:56

## 2020-11-20 RX ADMIN — BUPIVACAINE HYDROCHLORIDE 2 ML: 7.5 INJECTION, SOLUTION EPIDURAL; RETROBULBAR at 07:19

## 2020-11-20 RX ADMIN — MONTELUKAST SODIUM 10 MG: 10 TABLET, COATED ORAL at 19:45

## 2020-11-20 RX ADMIN — ONDANSETRON HYDROCHLORIDE 8 MG: 2 INJECTION INTRAMUSCULAR; INTRAVENOUS at 07:12

## 2020-11-20 RX ADMIN — SODIUM CHLORIDE, POTASSIUM CHLORIDE, SODIUM LACTATE AND CALCIUM CHLORIDE 999 ML/HR: 600; 310; 30; 20 INJECTION, SOLUTION INTRAVENOUS at 08:33

## 2020-11-20 RX ADMIN — SCOPALAMINE 1 PATCH: 1 PATCH, EXTENDED RELEASE TRANSDERMAL at 11:40

## 2020-11-20 RX ADMIN — Medication 3 ML: at 18:23

## 2020-11-20 RX ADMIN — SODIUM CHLORIDE, POTASSIUM CHLORIDE, SODIUM LACTATE AND CALCIUM CHLORIDE: 600; 310; 30; 20 INJECTION, SOLUTION INTRAVENOUS at 07:24

## 2020-11-20 RX ADMIN — Medication 0.2 MG: at 07:19

## 2020-11-20 RX ADMIN — OXYTOCIN-SODIUM CHLORIDE 0.9% IV SOLN 30 UNIT/500ML 650 ML/HR: 30-0.9/5 SOLUTION at 07:34

## 2020-11-20 RX ADMIN — CLINDAMYCIN IN 5 PERCENT DEXTROSE 900 MG: 18 INJECTION, SOLUTION INTRAVENOUS at 07:29

## 2020-11-20 RX ADMIN — PHENYLEPHRINE HYDROCHLORIDE 100 MCG: 10 INJECTION INTRAVENOUS at 07:49

## 2020-11-20 RX ADMIN — SODIUM CHLORIDE, POTASSIUM CHLORIDE, SODIUM LACTATE AND CALCIUM CHLORIDE 1000 ML: 600; 310; 30; 20 INJECTION, SOLUTION INTRAVENOUS at 05:29

## 2020-11-20 RX ADMIN — SIMETHICONE 80 MG: 80 TABLET, CHEWABLE ORAL at 19:44

## 2020-11-20 RX ADMIN — SODIUM CITRATE AND CITRIC ACID MONOHYDRATE 30 ML: 500; 334 SOLUTION ORAL at 06:59

## 2020-11-20 RX ADMIN — SIMETHICONE 80 MG: 80 TABLET, CHEWABLE ORAL at 17:59

## 2020-11-20 RX ADMIN — SODIUM CHLORIDE, POTASSIUM CHLORIDE, SODIUM LACTATE AND CALCIUM CHLORIDE 125 ML/HR: 600; 310; 30; 20 INJECTION, SOLUTION INTRAVENOUS at 10:16

## 2020-11-20 RX ADMIN — GENTAMICIN SULFATE 350 MG: 40 INJECTION, SOLUTION INTRAMUSCULAR; INTRAVENOUS at 07:20

## 2020-11-20 RX ADMIN — OXYTOCIN-SODIUM CHLORIDE 0.9% IV SOLN 30 UNIT/500ML 85 ML/HR: 30-0.9/5 SOLUTION at 10:17

## 2020-11-20 RX ADMIN — PHENYLEPHRINE HYDROCHLORIDE 100 MCG: 10 INJECTION INTRAVENOUS at 07:27

## 2020-11-20 RX ADMIN — ACETAMINOPHEN 1000 MG: 500 TABLET ORAL at 11:40

## 2020-11-20 RX ADMIN — ACETAMINOPHEN 1000 MG: 500 TABLET ORAL at 17:59

## 2020-11-20 RX ADMIN — OXYCODONE HYDROCHLORIDE 5 MG: 5 TABLET ORAL at 20:23

## 2020-11-20 RX ADMIN — SODIUM CHLORIDE, POTASSIUM CHLORIDE, SODIUM LACTATE AND CALCIUM CHLORIDE: 600; 310; 30; 20 INJECTION, SOLUTION INTRAVENOUS at 06:30

## 2020-11-20 NOTE — L&D DELIVERY NOTE
[facility]   Delivery Note    Delivery     Delivery: , Low Transverse     YOB: 2018    Time of Birth:  Gestational Age 7:33 AM   37w3d     Anesthesia: Spinal     Delivering clinician: Fernanad Hancock    Forceps?   No   Vacuum? No    Shoulder dystocia present: No        Delivery narrative:  See operative report    Infant    Findings: male  infant     Infant observations: Weight: 2480 g (5 lb 7.5 oz)   Length: 19.016  in  Observations/Comments:        Apgars: 9  @ 1 minute /    9  @ 5 minutes   Infant Name: Pablo Fuentes     Placenta, Cord, and Fluid    Placenta delivered  Spontaneous  at   2020  7:35 AM     Cord: 3 vessels  present.   Nuchal Cord?  no   Cord blood obtained: Yes    Cord gases obtained:  No    Cord gas results: Venous:  No results found for: PHCVEN    Arterial:  No results found for: PHCART     Repair    Episiotomy: None     No    Lacerations: No   Estimated Blood Loss:  400         Complications  IUGR    Disposition  Mother to Mother Baby/Postpartum  in stable condition currently.  Baby to remains with mom  in stable condition currently.    Fernanda Hancock MD  20  09:37 CST

## 2020-11-20 NOTE — ANESTHESIA PROCEDURE NOTES
Spinal Block      Stop Time: 11/20/2020 7:19 AM  Indication:at surgeon's request and procedure for pain  Performed By  CRNA: Lanette Hair CRNA  Preanesthetic Checklist  Completed: patient identified, site marked, surgical consent, pre-op evaluation, timeout performed, IV checked, risks and benefits discussed and monitors and equipment checked  Spinal Block Prep:  Patient Position:sitting  Sterile Tech:cap, gloves, mask and sterile barriers  Prep:Betadine  Patient Monitoring:blood pressure monitoring and continuous pulse oximetry  Spinal Block Procedure  Approach:midline  Guidance:landmark technique and palpation technique  Location:L3-L4  Needle Type:Pencan  Needle Gauge:24 G  Placement of Spinal needle event:cerebrospinal fluid aspirated  Paresthesia: no  Fluid Appearance:clear  Medications: bupivacaine PF (MARCAINE) 0.75 % injection, 2 mL  Med Administered at 11/20/2020 7:19 AM   Post Assessment  Patient Tolerance:patient tolerated the procedure well with no apparent complications  Complications no  Additional Notes  Spinal inserted by BARBARA Butts

## 2020-11-20 NOTE — PLAN OF CARE
Goal Outcome Evaluation:   VSS, denies pain at this time, has had intermittent nausea, fundus firm, rubra light

## 2020-11-20 NOTE — H&P (VIEW-ONLY)
HISTORY & PHYSICAL - Obstetrics  [facility]    Name: Anayeli Fuentes  MRN: [id number]  Location: Room/bed info not found  Date: 2020  CSN: [id number]      CHIEF COMPLAINT:  section    HISTORY OF PRESENT ILLNESS  Anayeli Fuentes is a 40 y.o.  at 37w2d gestational age with Estimated Date of Delivery: 20.  At her appointment today, she was found to be growth restricted with EFW 2611g (14%ile) w/ AC 4%ile, femur 1%ile, humerus 3%ile.  [facility] recommended delivery at 37 weeks.  Today she denies VB, LOF, or contractions.  Reports good FM.    Patient denies any chest pain, palpitations, headaches, lightheadedness, shortness of breath, cough, nausea, vomiting, diarrhea, constipation, fever, or chills.    ROS  Review of Systems   Constitutional: Negative.    HENT: Negative.    Eyes: Negative.    Respiratory: Negative.    Cardiovascular: Negative.    Gastrointestinal: Negative.    Endocrine: Negative.    Genitourinary: Negative.    Musculoskeletal: Negative.    Skin: Negative.    Neurological: Negative.    Hematological: Negative.    Psychiatric/Behavioral: Negative.      PRENATAL LAB RESULTS  Prenatal labs reviewed.    External Prenatal Results     Pregnancy Outside Results - Transcribed From Office Records - See Scanned Records For Details     Test Value Date Time    Hgb 12.0 g/dL 10/30/20 0959      9.9 g/dL 20 0926      11.7 g/dL 20 1451    Hct 37.5 % 10/30/20 0959      31.0 % 20 0926      36.5 % 20 1451    ABO A  20 1451    Rh Positive  20 1451    Antibody Screen Negative  20 1451    Glucose Fasting GTT 78 mg/dL 20 0815    Glucose Tolerance Test 1 hour 120 mg/dL 20 0915    Glucose Tolerance Test 3 hour 65 mg/dL 20 1115    Gonorrhea (discrete) Negative  20 1451    Chlamydia (discrete) Negative  20 1451    RPR Non-Reactive  20 1451    VDRL       Syphilis Antibody       Rubella Negative   "20 1451    HBsAg Non-Reactive  20 1451    Herpes Simplex Virus PCR       Herpes Simplex VIrus Culture       HIV Non-Reactive  20 1451    Hep C RNA Quant PCR       Hep C Antibody Non-Reactive  20 1451    AFP       Group B Strep Negative  11/10/20 1049    GBS Susceptibility to Clindamycin       GBS Susceptibility to Erythromycin       Fetal Fibronectin       Genetic Testing, Maternal Blood             Drug Screening     Test Value Date Time    Urine Drug Screen       Amphetamine Screen Negative  20 1451    Barbiturate Screen Negative  20 1451    Benzodiazepine Screen Negative  20 1451    Methadone Screen Negative  20 1451    Phencyclidine Screen Negative  20 1451    Opiates Screen Negative  20 1451    THC Screen Negative  20 1451    Cocaine Screen       Propoxyphene Screen Negative  20 1451    Buprenorphine Screen Negative  20 1451    Methamphetamine Screen       Oxycodone Screen Negative  20 1451    Tricyclic Antidepressants Screen Negative  20 1451              PRENATAL RISK FACTORS   Problems (from 20 to present)     Problem Noted Resolved    Anemia during pregnancy in third trimester 10/16/2020 by Fernanda Hancock MD No    Overview Addendum 10/30/2020 11:07 AM by Fernanda Hancock MD     S/p IV iron x2  Repeat Hgb 12.0         Supervision of high risk pregnancy in third trimester 2020 by Fernanda Hancock MD No    Overview Addendum 2020  8:59 AM by Fernanda Hancock MD     A pos / Rubella immune / GBS neg  Dating: LMP = 1TUS (7wk)  Genetics: Declined  Tdap:   Flu: Received at work  Anatomy: WNL, BOY \"Pablo Morales\"  1h Glucola: 142; 3h GTT WNL  H&H/Plts:   Lab Results   Component Value Date    HGB 12.0 10/30/2020    HCT 37.5 10/30/2020     10/30/2020   Breast/BC undecided         Previous  delivery affecting pregnancy 2020 by Santi, " Fernanda De La O MD No    Overview Addendum 10/30/2020 11:08 AM by Fernanda Hancock MD     Secondary to breech presentation  Undecided  vs RLTCS   consent signed 10/1  Scheduled for ERLTCS at 40wks on 128         History of gastric bypass 2020 by Fernanda Hancock MD No    Multigravida of advanced maternal age in third trimester 2020 by Fernanda Hancock MD No    Overview Addendum 2020  1:24 PM by Fernanda Hancock MD     Counseled NIPT, declines             OB HISTORY  OB History    Para Term  AB Living   2 1 1     1   SAB TAB Ectopic Molar Multiple Live Births           0 1      # Outcome Date GA Lbr Luciano/2nd Weight Sex Delivery Anes PTL Lv   2 Current            1 Term 17 38w3d  2835 g (6 lb 4 oz) F CS-LTranv Spinal  HORTENCIA     GYN HISTORY  Denies h/o sexually transmitted infections/pelvic inflammatory disease  Denies h/o gynecologic surgeries, including biopsies of the cervix    PAST MEDICAL HISTORY  Past Medical History:   Diagnosis Date   • Abdominal pain    • Asthma    • Chronic cholecystitis with calculus    • Iron deficiency anemia    • Kidney stone    • Pelvic congestion syndrome      PAST SURGICAL HISTORY  Past Surgical History:   Procedure Laterality Date   •  SECTION N/A 3/16/2017    Procedure:  SECTION PRIMARY;  Surgeon: Mino Bansal MD;  Location: [facility] LABOR DELIVERY;  Service:    • CHOLECYSTECTOMY WITH INTRAOPERATIVE CHOLANGIOGRAM  2014   • GASTRIC BANDING     • GASTRIC BYPASS       FAMILY HISTORY  Family History   Problem Relation Age of Onset   • Breast cancer Other    • Colon cancer Neg Hx    • Endometrial cancer Neg Hx    • Ovarian cancer Neg Hx      SOCIAL HISTORY  Social History     Socioeconomic History   • Marital status:      Spouse name: Not on file   • Number of children: Not on file   • Years of education: Not on file   • Highest education level: Not on  file   Tobacco Use   • Smoking status: Never Smoker   • Smokeless tobacco: Never Used   Substance and Sexual Activity   • Alcohol use: No   • Drug use: No   • Sexual activity: Yes     ALLERGIES  Allergies   Allergen Reactions   • Nsaids Shortness Of Breath   • Septra [Sulfamethoxazole-Trimethoprim]      *childhood Rx   • Zinacef [Cefuroxime In Sterile Water]      Childhood Rx     HOME MEDICATIONS  Prior to Admission medications    Medication Sig Start Date End Date Taking? Authorizing Provider   albuterol sulfate HFA (ProAir HFA) 108 (90 Base) MCG/ACT inhaler Inhale 2 puffs Every 6 (Six) Hours As Needed for Wheezing. 3/12/20  Yes Fernanda Hancock MD   calcium carbonate (Tums) 500 MG chewable tablet Chew 1 tablet As Needed.   Yes Ridge Otto MD   famotidine (Pepcid) 20 MG tablet Take 1 tablet by mouth 2 (Two) Times a Day. 10/15/20 10/15/21 Yes Fernanda Hancock MD   Fe Heme Polypeptide-Folic Acid (Proferrin-Forte) 12-1 MG tablet Take 1 tablet by mouth 3 (Three) Times a Day. 20  Yes Fernanda Hancock MD   montelukast (SINGULAIR) 10 MG tablet Take 1 tablet by mouth Daily. 1/9/15  Yes Ridge Otto MD   ondansetron (Zofran) 4 MG tablet Take 1 tablet by mouth Daily As Needed for Nausea or Vomiting. 20 Yes Fernanda Hancock MD   Prenatal MV & Min w/FA-DHA (PRENATAL ADULT GUMMY/DHA/FA) 0.4-25 MG chewable tablet Chew.   Yes Ridge Otto MD     PHYSICAL EXAM  /60   Wt 73.9 kg (163 lb)   LMP 2020 (Exact Date)   BMI 23.39 kg/m²   General: No acute distress.  Well developed, well nourished.  Pleasant.  Heart: Regular rate and rhythm.  No murmurs, rubs, or gallops.  Lungs: Clear to auscultation bilaterally.  No wheezes, rales, or rhonchi.  Abdomen: Soft, nontender to palpation, enlarged by gravid uterus.  Extremities: Mild edema noted bilaterally.    IMPRESSION  Anayeli Fuentes is a 40 y.o.  at 37w2d  scheduled for delivery at 37w3d via RLTCS and B/L salpingectomy secondary to IUGR.  Declines TOLAC as she does not want to be induced.    PLAN  1.  IUP at 37w2d with IUGR  - Admit: Labor and Delivery  - Attending: Dr. Fernanda Hancock  - Condition: Stable  - Vitals: per protocol  - Activity: ad daniele  - Nursing: NST prior to surgery  - Diet: NPO  - IV fluids:  mL/hr  - Allergies: NSAIDs, Septra, Zinacef  - Labs: CBC, T&S, UDS  - GBS: neg.  Antibiotics not indicated.  - Ancef 2g prior to skin incision  - Patient consented for  section.  Reviewed risks and benefits to include injury to surrounding organs (bowel, bladder, ureters, blood vessels, nerves, baby), infection, bleeding (possibly requiring blood transfusion and/or hysterectomy), and maternal/fetal death.    - Anayeli Fuentes and I have discussed pain goals for this hospitalization after reviewing her current clinical condition, medical history and prior pain experiences.  The goal is to keep her pain level appropriate.  To help achieve this, schedule Tylenol and NSAIDS, +/- Duramorph or PCA.    This document has been electronically signed by Fernanda Hancock MD on 2020 18:23 CST.

## 2020-11-20 NOTE — OP NOTE
[facility]  Operative Report    Name: Anayeli Fuentes  MRN: [id number]  Date: 2020  CSN: [id number]      Location: [facility] LABOR DELIVERY    Service: Obstetrics    Pre-op Diagnosis:   1.  IUP at 37w3d  2.  Prior  section x1  3.  IUGR (AC 4%ile)  4.  Anemia of pregnancy  5.  History of gastric bypass    Post-op Diagnosis: Same, in addition to delivered    Surgeon: Fernanda Hancock MD    Assistant:   Juve Kemp MD (General Surgery)  VIKY Lynn MS3    Staff:  Circulator: Maryanne Og RN  Scrub Person: Angela Kumari  L & D Nurse: Tasneem Tony RN; Rosetta Bear RN  NICU Nurse: Phoebe Goodrich RN  Assistant: Macie Ascencio CSA    Anesthesia: Spinal w/ Duramorph    Anesthesia Staff:  CRNA: Lanette Hair CRNA  Student Nurse Anesthetist: Magdiel Duncan SRNA; Jesus Tony SRNA    Operation: Repeat low-transverse  sction    Drains: Lucas catheter, draining clear yellow urine    Complications: Small bowel de-serosalization    Findings: Normal appearing abdomen.  Minimal scarring noted.  Diastasis of rectus muscles noted.  Bladder minimally adhered to mid-uterine body.  Delivery of viable male  weighing 2480 grams with Apgars of 9 and 9.  Bilateral fallopian tubes and ovaries appeared normal.    Condition: Stable    Specimens/Disposition: Placenta and umbilical cord- to pathology    Estimated Blood Loss: 400 mL  Quantitative Blood Loss: 200 mL  IV Fluids: 2000 mL crystalloid  Urine Output: 40 mL    Indications: Anayeli Fuentes, 40 y.o.,  at 37w3d presenting for repeat  secondary to IUGR with AC 4%ile.  Declined TOLAC.    Description of Operation:  The patient was identified and the procedure verified as a  section delivery.  The patient was given spinal anesthesia.  Patient prepared and draped in normal sterile fashion in dorsal supine position with a leftward tilt.  A  transverse skin incision was made with the scalpel and carried down through the subcutaneous tissue to the fascia using the Bovie.  Fascial incision was made with the Bovie and extended transversely with Herring scissors.  The superior aspect of the fascia was grasped with Kocher clamps and the rectus muscle was dissected off bluntly and sharply with Herring scissors.  The inferior aspect of the fascia was then grasped with Kocher clamps and the rectus muscle and pyramidalis were dissected off bluntly and then sharply with Herring scissors.  The rectus muscle was then  in the midline and the peritoneum was identified and entered bluntly.  The peritoneal incision was extended transversely.  Bladder blade and retractor were inserted.  The utero-vesical peritoneal reflection was identified and incised transversely with Metzenbaum scissors.  The bladder flap was bluntly freed from the lower uterine segment. The bladder blade was adjusted.  A low transverse uterine incision was made with a scalpel and the uterine incision was extended with upward traction.  The amniotic sac was ruptured with an Allis clamp.  Delivered from cephalic presentation was a live male  weighing 2480 grams with Apgar scores of 9 at one minute and 9 at five minutes.  Cord clamped and cut and infant with bulb suction were handed to awaiting staff.  Cord blood was obtained and sent.  The placenta was removed intact and appeared normal.  Uterus exteriorized and cleared of all clots and debris.  The uterus, tubes and ovaries appeared normal.  At the level of the hysterotomy, there was an outpouching noted of the broad ligament.  This was pushed down.  The uterine incision was closed with running locked sutures of 0-Vicryl.  The incision was inspected and then re-evaluated with the outpouching of the broad ligament, and it was found that the small bowel had been pushing up on the thin broad ligament.  The suture was cut and the small bowel was  inspected.  The serosa had been denuded but there was no occult injury.  Dr. Kemp was called and he inspected the bowel.  He agreed that there was no injury, just a de-serosalization.  He placed 3 interrupted sutures of 2-0 Vicryl to oversew the bowel.  No additional procedure was indicated.  The uterine incision was closed again with running locked sutures of 0-Vicryl.  Posterior cul-de-sac was cleared.  Uterus was reinserted atraumatically.  Hemostasis was observed.  Bilateral paracolic gutters cleared.  Inspection of the abdomen and pelvis prior to abdominal wall closure revealed no evidence of retained instruments or sponges.  The fascia was then reapproximated with running sutures of 0-Vicryl.  Subcutaneous layer closed with 2-0 plain gut.  The skin was reapproximated with 3-0 Monocryl in subcuticular fashion.  Prineo dressing applied.    Estimated blood loss was 400 mL.  Sponge, needle and instrument counts were correct x2.  There were no intraoperative complications and patient tolerated the procedure well.  The patient was escorted to her room in stable condition.    The patient received IV gentamicin 5 mg/kg and IV clindamycin 900mg for antibiotic prophylaxis prior to the start of the procedure.    Macie Ascencio CST CSFA was responsible for performing the following activities: Retraction, Suction, Irrigation, Suturing, Closing, Placing Dressing and Delivery of Fetus and their skilled assistance was necessary for the success of this case.    This document has been electronically signed by Fernanda Hancock MD on November 20, 2020 09:17 CST.

## 2020-11-20 NOTE — PROGRESS NOTES
"CC: Prenatal visit    Anayeli Fuentes is a 40 y.o.  at 37w2d.  Doing well.  Denies contractions, LOF, or VB.  Reports good FM.    /60   Wt 73.9 kg (163 lb)   LMP 2020 (Exact Date)   BMI 23.39 kg/m²      Fetal Heart Rate: 129     Prelim US- EFW 2611g (14%ile) w/ AC 4%ile, femur 1%ile, humerus 3%ile; ELENO 13.9 cm, BPP 8/8     Problems (from 20 to present)     Problem Noted Resolved    Anemia during pregnancy in third trimester 10/16/2020 by Fernanda Hancock MD No    Overview Addendum 10/30/2020 11:07 AM by Fernanda Hancock MD     S/p IV iron x2  Repeat Hgb 12.0         Supervision of high risk pregnancy in third trimester 2020 by Fernanda Hancock MD No    Overview Addendum 2020  8:59 AM by Fernanda Hancock MD     A pos / Rubella immune / GBS neg  Dating: LMP = 1TUS (7wk)  Genetics: Declined  Tdap:   Flu: Received at work  Anatomy: WNL, BOY \"Pablo Morales\"  1h Glucola: 142; 3h GTT WNL  H&H/Plts:   Lab Results   Component Value Date    HGB 12.0 10/30/2020    HCT 37.5 10/30/2020     10/30/2020   Breast/BC undecided         Previous  delivery affecting pregnancy 2020 by Fernanda Hancock MD No    Overview Addendum 10/30/2020 11:08 AM by Fernanda Hancock MD     Secondary to breech presentation  Undecided  vs RLTCS   consent signed 10/1  Scheduled for ERLTCS at 40wks on          History of gastric bypass 2020 by Fernanda Hancock MD No    Multigravida of advanced maternal age in third trimester 2020 by Fernanda Hancock MD No    Overview Addendum 2020  1:24 PM by Fernanda Hancock MD     Counseled NIPT, declines               A/P: Anayeli Fuentes is a 40 y.o.  at 37w2d.  - Per Dr. Milligan, recommend delivery at 37 weeks secondary to IUGR (AC <10%ile).  Discussed w/ patient, will proceed with RLTCS tomorrow " at 710 AM.  - Reviewed COVID-19 visitation policy  - Reviewed COVID-19 precautions     Diagnosis Plan   1. Supervision of high risk pregnancy in third trimester     2. Anemia during pregnancy in third trimester     3. Previous  delivery affecting pregnancy     4. History of gastric bypass     5. Multigravida of advanced maternal age in third trimester     6. 37 weeks gestation of pregnancy     7. Poor fetal growth affecting management of mother in third trimester, single or unspecified fetus       Fernanda Hancock MD  2020  18:15 CST

## 2020-11-20 NOTE — H&P
HISTORY & PHYSICAL - Obstetrics  [facility]    Name: Anayeli Fuentes  MRN: [id number]  Location: Room/bed info not found  Date: 2020  CSN: [id number]      CHIEF COMPLAINT:  section    HISTORY OF PRESENT ILLNESS  Anayeli Fuentes is a 40 y.o.  at 37w2d gestational age with Estimated Date of Delivery: 20.  At her appointment today, she was found to be growth restricted with EFW 2611g (14%ile) w/ AC 4%ile, femur 1%ile, humerus 3%ile.  [facility] recommended delivery at 37 weeks.  Today she denies VB, LOF, or contractions.  Reports good FM.    Patient denies any chest pain, palpitations, headaches, lightheadedness, shortness of breath, cough, nausea, vomiting, diarrhea, constipation, fever, or chills.    ROS  Review of Systems   Constitutional: Negative.    HENT: Negative.    Eyes: Negative.    Respiratory: Negative.    Cardiovascular: Negative.    Gastrointestinal: Negative.    Endocrine: Negative.    Genitourinary: Negative.    Musculoskeletal: Negative.    Skin: Negative.    Neurological: Negative.    Hematological: Negative.    Psychiatric/Behavioral: Negative.      PRENATAL LAB RESULTS  Prenatal labs reviewed.    External Prenatal Results     Pregnancy Outside Results - Transcribed From Office Records - See Scanned Records For Details     Test Value Date Time    Hgb 12.0 g/dL 10/30/20 0959      9.9 g/dL 20 0926      11.7 g/dL 20 1451    Hct 37.5 % 10/30/20 0959      31.0 % 20 0926      36.5 % 20 1451    ABO A  20 1451    Rh Positive  20 1451    Antibody Screen Negative  20 1451    Glucose Fasting GTT 78 mg/dL 20 0815    Glucose Tolerance Test 1 hour 120 mg/dL 20 0915    Glucose Tolerance Test 3 hour 65 mg/dL 20 1115    Gonorrhea (discrete) Negative  20 1451    Chlamydia (discrete) Negative  20 1451    RPR Non-Reactive  20 1451    VDRL       Syphilis Antibody       Rubella Negative   "20 1451    HBsAg Non-Reactive  20 1451    Herpes Simplex Virus PCR       Herpes Simplex VIrus Culture       HIV Non-Reactive  20 1451    Hep C RNA Quant PCR       Hep C Antibody Non-Reactive  20 1451    AFP       Group B Strep Negative  11/10/20 1049    GBS Susceptibility to Clindamycin       GBS Susceptibility to Erythromycin       Fetal Fibronectin       Genetic Testing, Maternal Blood             Drug Screening     Test Value Date Time    Urine Drug Screen       Amphetamine Screen Negative  20 1451    Barbiturate Screen Negative  20 1451    Benzodiazepine Screen Negative  20 1451    Methadone Screen Negative  20 1451    Phencyclidine Screen Negative  20 1451    Opiates Screen Negative  20 1451    THC Screen Negative  20 1451    Cocaine Screen       Propoxyphene Screen Negative  20 1451    Buprenorphine Screen Negative  20 1451    Methamphetamine Screen       Oxycodone Screen Negative  20 1451    Tricyclic Antidepressants Screen Negative  20 1451              PRENATAL RISK FACTORS   Problems (from 20 to present)     Problem Noted Resolved    Anemia during pregnancy in third trimester 10/16/2020 by Fernanda Hancock MD No    Overview Addendum 10/30/2020 11:07 AM by Fernanda Hancock MD     S/p IV iron x2  Repeat Hgb 12.0         Supervision of high risk pregnancy in third trimester 2020 by Fernanda Hancock MD No    Overview Addendum 2020  8:59 AM by Fernanda Hancock MD     A pos / Rubella immune / GBS neg  Dating: LMP = 1TUS (7wk)  Genetics: Declined  Tdap:   Flu: Received at work  Anatomy: WNL, BOY \"Pablo Morales\"  1h Glucola: 142; 3h GTT WNL  H&H/Plts:   Lab Results   Component Value Date    HGB 12.0 10/30/2020    HCT 37.5 10/30/2020     10/30/2020   Breast/BC undecided         Previous  delivery affecting pregnancy 2020 by Santi, " Fernanda De La O MD No    Overview Addendum 10/30/2020 11:08 AM by Fernanda Hancock MD     Secondary to breech presentation  Undecided  vs RLTCS   consent signed 10/1  Scheduled for ERLTCS at 40wks on 128         History of gastric bypass 2020 by Fernanda Hancock MD No    Multigravida of advanced maternal age in third trimester 2020 by Fernanda Hancock MD No    Overview Addendum 2020  1:24 PM by Fernanda Hancock MD     Counseled NIPT, declines             OB HISTORY  OB History    Para Term  AB Living   2 1 1     1   SAB TAB Ectopic Molar Multiple Live Births           0 1      # Outcome Date GA Lbr Luciano/2nd Weight Sex Delivery Anes PTL Lv   2 Current            1 Term 17 38w3d  2835 g (6 lb 4 oz) F CS-LTranv Spinal  HORTENCIA     GYN HISTORY  Denies h/o sexually transmitted infections/pelvic inflammatory disease  Denies h/o gynecologic surgeries, including biopsies of the cervix    PAST MEDICAL HISTORY  Past Medical History:   Diagnosis Date   • Abdominal pain    • Asthma    • Chronic cholecystitis with calculus    • Iron deficiency anemia    • Kidney stone    • Pelvic congestion syndrome      PAST SURGICAL HISTORY  Past Surgical History:   Procedure Laterality Date   •  SECTION N/A 3/16/2017    Procedure:  SECTION PRIMARY;  Surgeon: Mino Bansal MD;  Location: [facility] LABOR DELIVERY;  Service:    • CHOLECYSTECTOMY WITH INTRAOPERATIVE CHOLANGIOGRAM  2014   • GASTRIC BANDING     • GASTRIC BYPASS       FAMILY HISTORY  Family History   Problem Relation Age of Onset   • Breast cancer Other    • Colon cancer Neg Hx    • Endometrial cancer Neg Hx    • Ovarian cancer Neg Hx      SOCIAL HISTORY  Social History     Socioeconomic History   • Marital status:      Spouse name: Not on file   • Number of children: Not on file   • Years of education: Not on file   • Highest education level: Not on  file   Tobacco Use   • Smoking status: Never Smoker   • Smokeless tobacco: Never Used   Substance and Sexual Activity   • Alcohol use: No   • Drug use: No   • Sexual activity: Yes     ALLERGIES  Allergies   Allergen Reactions   • Nsaids Shortness Of Breath   • Septra [Sulfamethoxazole-Trimethoprim]      *childhood Rx   • Zinacef [Cefuroxime In Sterile Water]      Childhood Rx     HOME MEDICATIONS  Prior to Admission medications    Medication Sig Start Date End Date Taking? Authorizing Provider   albuterol sulfate HFA (ProAir HFA) 108 (90 Base) MCG/ACT inhaler Inhale 2 puffs Every 6 (Six) Hours As Needed for Wheezing. 3/12/20  Yes Fernanda Hancock MD   calcium carbonate (Tums) 500 MG chewable tablet Chew 1 tablet As Needed.   Yes Ridge Otto MD   famotidine (Pepcid) 20 MG tablet Take 1 tablet by mouth 2 (Two) Times a Day. 10/15/20 10/15/21 Yes Fernanda Hancock MD   Fe Heme Polypeptide-Folic Acid (Proferrin-Forte) 12-1 MG tablet Take 1 tablet by mouth 3 (Three) Times a Day. 20  Yes Fernanda Hancock MD   montelukast (SINGULAIR) 10 MG tablet Take 1 tablet by mouth Daily. 1/9/15  Yes Ridge Otto MD   ondansetron (Zofran) 4 MG tablet Take 1 tablet by mouth Daily As Needed for Nausea or Vomiting. 20 Yes Fernanda Hancock MD   Prenatal MV & Min w/FA-DHA (PRENATAL ADULT GUMMY/DHA/FA) 0.4-25 MG chewable tablet Chew.   Yes Ridge Otto MD     PHYSICAL EXAM  /60   Wt 73.9 kg (163 lb)   LMP 2020 (Exact Date)   BMI 23.39 kg/m²   General: No acute distress.  Well developed, well nourished.  Pleasant.  Heart: Regular rate and rhythm.  No murmurs, rubs, or gallops.  Lungs: Clear to auscultation bilaterally.  No wheezes, rales, or rhonchi.  Abdomen: Soft, nontender to palpation, enlarged by gravid uterus.  Extremities: Mild edema noted bilaterally.    IMPRESSION  Anayeli Fuentes is a 40 y.o.  at 37w2d  scheduled for delivery at 37w3d via RLTCS and B/L salpingectomy secondary to IUGR.  Declines TOLAC as she does not want to be induced.    PLAN  1.  IUP at 37w2d with IUGR  - Admit: Labor and Delivery  - Attending: Dr. Fernanda Hancock  - Condition: Stable  - Vitals: per protocol  - Activity: ad daniele  - Nursing: NST prior to surgery  - Diet: NPO  - IV fluids:  mL/hr  - Allergies: NSAIDs, Septra, Zinacef  - Labs: CBC, T&S, UDS  - GBS: neg.  Antibiotics not indicated.  - Ancef 2g prior to skin incision  - Patient consented for  section.  Reviewed risks and benefits to include injury to surrounding organs (bowel, bladder, ureters, blood vessels, nerves, baby), infection, bleeding (possibly requiring blood transfusion and/or hysterectomy), and maternal/fetal death.    - Anayeli Fuentes and I have discussed pain goals for this hospitalization after reviewing her current clinical condition, medical history and prior pain experiences.  The goal is to keep her pain level appropriate.  To help achieve this, schedule Tylenol and NSAIDS, +/- Duramorph or PCA.    This document has been electronically signed by Fernanda Hancock MD on 2020 18:23 CST.

## 2020-11-20 NOTE — OP NOTE
Operative Note    Anayeli Fuentes  2020    Pre-op Diagnosis:   Previous  delivery affecting pregnancy [O34.219]  Intra-operative consult for partial thickness bowel injury to colon    Post-op Diagnosis:     Post-Op Diagnosis Codes:     * Previous  delivery affecting pregnancy [O34.219]  Intra-operative consult for partial thickness bowel injury to colon    Procedure/CPT® Codes:      Procedure(s):  Repair of partial thickness injury to colon during     Surgeon(s):  Fernanda Hancock MD Armstrong, James Edward, MD    Anesthesia: Spinal    Staff:   Circulator: Maryanne Og RN  Scrub Person: Angela Kumari  L & D Nurse: Tasneem Tony RN; Rosetta Bear RN  NICU Nurse: Phoebe Goodrich RN  Assistant: Macie Ascencio CSA    Estimated Blood Loss: none    Specimens:                ID Type Source Tests Collected by Time   A : placenta Tissue Placenta TISSUE PATHOLOGY EXAM Fernanda Hancock MD 2020 0738         Drains:   Urethral Catheter Non-latex;Straight-tip;Silicone 16 Fr. (Active)       Findings: partial thickness injury to colon    Complications: none    Indication: Partial Thickness injury to colon during     Operative Note:    I was asked to join Dr. Hancock in the operating room following delivery of a healthy baby boy via .  During closure of the uterus a small serosal injury was noted on the colon.  This was repaired using three interrupted 3-0 vicryl stitches in lembert fashion.  There was no apparent full thickness injury or contamination, the colon otherwise appeared healthy.  Dr. Hancock then completed the case per her notes.    Assistant: Macie Ascencio CSA was responsible for performing the following activities: Retraction and their skilled assistance was necessary for the success of this case.          This document has been electronically signed by Juve Kemp MD on 2020  08:50 CST      Juve Kemp MD     Date: 11/20/2020  Time: 08:47 CST

## 2020-11-20 NOTE — ANESTHESIA POSTPROCEDURE EVALUATION
Patient: Anayeli Fuentes    Procedure Summary     Date: 20 Room / Location: [facility] LABOR DELIVERY 1 / [facility] LABOR DELIVERY    Anesthesia Start: 712 Anesthesia Stop: 823    Procedure:  SECTION REPEAT (N/A Abdomen) Diagnosis:       Previous  delivery affecting pregnancy      (Previous  delivery affecting pregnancy [O34.219])    Surgeon: Fernanda Hancock MD Provider: Lanette Hair CRNA    Anesthesia Type: spinal ASA Status: 2          Anesthesia Type: spinal    Vitals  Vitals Value Taken Time   BP 99/54 20   Temp     Pulse 78 20   Resp     SpO2 94 % 20   Vitals shown include unvalidated device data.        Post Anesthesia Care and Evaluation    Patient location during evaluation: PACU  Patient participation: complete - patient participated  Level of consciousness: awake and awake and alert  Pain score: 0  Pain management: adequate  Airway patency: patent  Anesthetic complications: No anesthetic complications  PONV Status: none  Cardiovascular status: acceptable and stable  Respiratory status: acceptable, room air and spontaneous ventilation  Hydration status: acceptable  Post Neuraxial Block status: Motor and sensory function returned to baseline and No signs or symptoms of PDPH

## 2020-11-20 NOTE — PLAN OF CARE
Goal Outcome Evaluation:   VSS, pt denies pain at this time, output decreased, bolusing another L of LR, fundus firm, rubra scant

## 2020-11-20 NOTE — PLAN OF CARE
Problem: Adult Inpatient Plan of Care  Goal: Plan of Care Review  Outcome: Ongoing, Progressing     Problem:  Fall Injury Risk  Goal: Absence of Fall, Infant Drop and Related Injury  Outcome: Ongoing, Progressing     Problem: Adjustment to Role Transition (Postpartum  Delivery)  Goal: Successful Maternal Role Transition  Outcome: Ongoing, Progressing     Problem: Bleeding (Postpartum  Delivery)  Goal: Hemostasis  Outcome: Ongoing, Progressing   Goal Outcome Evaluation:      VSS ,, bleeding is minimal, has ambulated in room, IS used , pain is VAS2. Urinary output good. + Bonding with baby. Breastfeeding going well so far. Lactation has seen pt. Pt has pump at home. Has hand expressed milk for infant and latched him several times today.

## 2020-11-20 NOTE — INTERVAL H&P NOTE
"H&P reviewed. The patient was examined and there are no changes to the H&P. No concerns.  Decided that she does not want a BTL.    /67 (BP Location: Left arm, Patient Position: Sitting)   Pulse 68   Temp 97.9 °F (36.6 °C) (Oral)   Resp 18   Ht 177.8 cm (70\")   Wt 73.9 kg (163 lb)   LMP 2020 (Exact Date)   SpO2 100%   Breastfeeding Yes   BMI 23.39 kg/m²   Gen: NAD, AAO x3  FHT: Cat 1    A/P: Anayeli Fuentes is a 40 y.o.  at 37w3d presenting with IUGR (AC <10%ile).  Declined TOLAC.  - Proceed with repeat  section.  Risks previously discussed.  - Ancef 2g    This document has been electronically signed by Fernanda Hancock MD on 2020 06:54 CST.  "

## 2020-11-21 LAB
HCT VFR BLD AUTO: 36.9 % (ref 34–46.6)
HGB BLD-MCNC: 11.9 G/DL (ref 12–15.9)

## 2020-11-21 PROCEDURE — 85014 HEMATOCRIT: CPT | Performed by: OBSTETRICS & GYNECOLOGY

## 2020-11-21 PROCEDURE — 0503F POSTPARTUM CARE VISIT: CPT | Performed by: OBSTETRICS & GYNECOLOGY

## 2020-11-21 PROCEDURE — 59514 CESAREAN DELIVERY ONLY: CPT | Performed by: SPECIALIST/TECHNOLOGIST, OTHER

## 2020-11-21 PROCEDURE — 25010000002 DEXAMETHASONE PER 1 MG: Performed by: OBSTETRICS & GYNECOLOGY

## 2020-11-21 PROCEDURE — 85018 HEMOGLOBIN: CPT | Performed by: OBSTETRICS & GYNECOLOGY

## 2020-11-21 PROCEDURE — 63710000001 PREDNISONE PER 1 MG: Performed by: OBSTETRICS & GYNECOLOGY

## 2020-11-21 RX ORDER — PREDNISONE 20 MG/1
40 TABLET ORAL DAILY
Status: COMPLETED | OUTPATIENT
Start: 2020-11-21 | End: 2020-11-22

## 2020-11-21 RX ORDER — DEXAMETHASONE SODIUM PHOSPHATE 4 MG/ML
4 INJECTION, SOLUTION INTRA-ARTICULAR; INTRALESIONAL; INTRAMUSCULAR; INTRAVENOUS; SOFT TISSUE ONCE
Status: COMPLETED | OUTPATIENT
Start: 2020-11-21 | End: 2020-11-21

## 2020-11-21 RX ADMIN — MONTELUKAST SODIUM 10 MG: 10 TABLET, COATED ORAL at 21:12

## 2020-11-21 RX ADMIN — ACETAMINOPHEN 1000 MG: 500 TABLET ORAL at 05:12

## 2020-11-21 RX ADMIN — SIMETHICONE 80 MG: 80 TABLET, CHEWABLE ORAL at 18:01

## 2020-11-21 RX ADMIN — ACETAMINOPHEN 1000 MG: 500 TABLET ORAL at 18:01

## 2020-11-21 RX ADMIN — OXYCODONE HYDROCHLORIDE 5 MG: 5 TABLET ORAL at 23:18

## 2020-11-21 RX ADMIN — PREDNISONE 40 MG: 20 TABLET ORAL at 14:35

## 2020-11-21 RX ADMIN — SIMETHICONE 80 MG: 80 TABLET, CHEWABLE ORAL at 08:23

## 2020-11-21 RX ADMIN — ACETAMINOPHEN 1000 MG: 500 TABLET ORAL at 11:49

## 2020-11-21 RX ADMIN — ACETAMINOPHEN 1000 MG: 500 TABLET ORAL at 23:19

## 2020-11-21 RX ADMIN — Medication 1 TABLET: at 08:23

## 2020-11-21 RX ADMIN — OXYCODONE HYDROCHLORIDE 5 MG: 5 TABLET ORAL at 11:49

## 2020-11-21 RX ADMIN — ACETAMINOPHEN 1000 MG: 500 TABLET ORAL at 00:30

## 2020-11-21 RX ADMIN — SIMETHICONE 80 MG: 80 TABLET, CHEWABLE ORAL at 21:12

## 2020-11-21 RX ADMIN — SIMETHICONE 80 MG: 80 TABLET, CHEWABLE ORAL at 11:49

## 2020-11-21 RX ADMIN — DEXAMETHASONE SODIUM PHOSPHATE 4 MG: 4 INJECTION, SOLUTION INTRAMUSCULAR; INTRAVENOUS at 11:48

## 2020-11-21 NOTE — PROGRESS NOTES
[facility] Post  Progress Note  Hospital Day: 1  Subjective:  Postop day: 1 Day Post-Op  The patient feels well. Pain is well controlled with current medications. The baby is stable. The patient is ambulating well. The patient is tolerating a normal diet. Flatus has not been passed.  Lochia lessening.  Spontaneously voiding.  Feeding method: Breastfeed.  Birth control plan: IUD    Meds Reviewed  ROS reviewed. All other ROS other than mentioned above are reported as negative.  Surgical history updated.  Prenatal Labs:    Lab Results   Component Value Date    RUBELLAABIGG Negative 2020    ABO A 2020    RH Positive 2020         Objective:  Vitals:    20 1755 20 1945 20 0116 20 0509   BP: 114/59 119/63 103/56 102/63   BP Location:  Right arm Right arm Right arm   Patient Position:  Lying Sitting Lying   Pulse: 70 56 51 51   Resp: 18 18 18 18   Temp: 98.7 °F (37.1 °C) 98.4 °F (36.9 °C) 98.2 °F (36.8 °C) 98.6 °F (37 °C)   TempSrc: Axillary Oral Oral Oral   SpO2:  98% 99% 100%   Weight:       Height:          General: alert, well appearing, in no apparent distress  CVS: RRR, S1/S2, no murmur  Lungs: clear to auscultation, no wheezes or rales and unlabored breathing  Bowel Sounds: active  Uterine Fundus: firm  Incision: no significant drainage, no dehiscence, no significant erythema, mesh is sticking to both sides of incision  Dressing: binder over wound is dry    Labs:  Lab Results   Component Value Date    WBC 11.75 (H) 2020    HGB 11.9 (L) 2020    HCT 36.9 2020    MCV 93.2 2020     2020    RH Positive 2020    HEPBSAG Non-Reactive 2020     ?  Assessment:   Active Hospital Problems    Diagnosis  POA   • **Poor fetal growth affecting management of mother in third trimester [O36.8254]  Yes   • Single delivery by  section [O82]  No   • Anemia during pregnancy in third trimester [O99.013]  Yes   •  Previous  delivery affecting pregnancy [O34.219]  Yes   • Supervision of high risk pregnancy in third trimester [O09.93]  Not Applicable   • History of gastric bypass [Z98.84]  Not Applicable   • Multigravida of advanced maternal age in third trimester [O09.523]  Yes      Resolved Hospital Problems   No resolved problems to display.       Plan:  40 y.o.  37w3d with Estimated Date of Delivery: 20 s/p , Low Transverse  for delivery of viable Male  POD 1.     Doing well postoperatively.  Has yet to have flatus.  Child in NICU.    -Continue to monitor    Signature    This document has been electronically signed by Elpidio Morse MD on 2020 06:56 CST

## 2020-11-22 VITALS
HEIGHT: 70 IN | OXYGEN SATURATION: 96 % | HEART RATE: 68 BPM | SYSTOLIC BLOOD PRESSURE: 128 MMHG | WEIGHT: 163 LBS | RESPIRATION RATE: 16 BRPM | TEMPERATURE: 98.6 F | BODY MASS INDEX: 23.34 KG/M2 | DIASTOLIC BLOOD PRESSURE: 59 MMHG

## 2020-11-22 PROCEDURE — 0503F POSTPARTUM CARE VISIT: CPT | Performed by: OBSTETRICS & GYNECOLOGY

## 2020-11-22 PROCEDURE — 63710000001 PREDNISONE PER 1 MG: Performed by: OBSTETRICS & GYNECOLOGY

## 2020-11-22 RX ORDER — ACETAMINOPHEN 500 MG
1000 TABLET ORAL EVERY 6 HOURS
Qty: 60 TABLET | Refills: 1 | Status: SHIPPED | OUTPATIENT
Start: 2020-11-22

## 2020-11-22 RX ORDER — PSEUDOEPHEDRINE HCL 30 MG
100 TABLET ORAL 2 TIMES DAILY PRN
Qty: 60 CAPSULE | Refills: 0 | Status: SHIPPED | OUTPATIENT
Start: 2020-11-22

## 2020-11-22 RX ORDER — OXYCODONE HYDROCHLORIDE 5 MG/1
5 TABLET ORAL EVERY 4 HOURS PRN
Qty: 15 TABLET | Refills: 0 | Status: SHIPPED | OUTPATIENT
Start: 2020-11-22 | End: 2020-11-30

## 2020-11-22 RX ADMIN — SIMETHICONE 80 MG: 80 TABLET, CHEWABLE ORAL at 08:34

## 2020-11-22 RX ADMIN — Medication 1 TABLET: at 08:34

## 2020-11-22 RX ADMIN — ACETAMINOPHEN 1000 MG: 500 TABLET ORAL at 12:47

## 2020-11-22 RX ADMIN — ACETAMINOPHEN 1000 MG: 500 TABLET ORAL at 05:49

## 2020-11-22 RX ADMIN — OXYCODONE HYDROCHLORIDE 5 MG: 5 TABLET ORAL at 05:49

## 2020-11-22 RX ADMIN — PREDNISONE 40 MG: 20 TABLET ORAL at 08:34

## 2020-11-22 RX ADMIN — SIMETHICONE 80 MG: 80 TABLET, CHEWABLE ORAL at 12:47

## 2020-11-22 NOTE — PLAN OF CARE
Goal Outcome Evaluation:  Plan of Care Reviewed With: patient  Progress: improving  Outcome Summary: pain controlled with po pain med, pt up out of bed ambulating in room, voided, + flatus, FF at -3, incision site clear, tolerating po intake. Breastfeeding well.  supportive.

## 2020-11-22 NOTE — DISCHARGE SUMMARY
"[facility]  Discharge Summary  Patient Name: Anayeli Fuentes  : 1980  MRN: [id number]  CSN: [id number]    Discharge Summary    Date of Admission: 2020   Date of Discharge: 2020   Principle Discharge Dx: Active Hospital Problems    Diagnosis POA   • **Poor fetal growth affecting management of mother in third trimester [O36.5930] Yes   • Single delivery by  section [O82] No   • Anemia during pregnancy in third trimester [O99.013] Yes     S/p IV iron x2  Repeat Hgb 12.0     • Previous  delivery affecting pregnancy [O34.219] Yes     Secondary to breech presentation  Undecided  vs RLTCS   consent signed 10/1  Scheduled for ERLTCS at 40wks on      • Supervision of high risk pregnancy in third trimester [O09.93] Not Applicable     A pos / Rubella immune / GBS neg  Dating: LMP = 1TUS (7wk)  Genetics: Declined  Tdap:   Flu: Received at work  Anatomy: WNL, BOY \"Pablo Andrew\"  1h Glucola: 142; 3h GTT WNL  H&H/Plts:   Lab Results   Component Value Date    HGB 12.0 10/30/2020    HCT 37.5 10/30/2020     10/30/2020   Breast/BC undecided     • History of gastric bypass [Z98.84] Not Applicable   • Multigravida of advanced maternal age in third trimester [O09.523] Yes     Counseled NIPT, declines        Procedures Performed: Procedure(s):   SECTION REPEAT   Brief History: Patient is a 40 y.o. now  who presented to labor and delivery at 37w3d for delivery secondary to newly-diagnosed IUGR.   Hospital Course: Patient presented at 37w3d for delivery secondary to newly-diagnosed IUGR.  She had a RLTCS.  Her postoperative course was unremarkable.  On POD #2 she expressed the desire for discharge.  She had passed gas and was urinating normally.  She was eating a regular diet without difficulty.  She was ambulating well.  Her incision was clean, dry and intact.  Discharge instructions were given.  All questions were answered "   Condition:  Discharge Activity:  Discharge Diet: Stable  Activity Instructions     Bathing Restrictions      Type of Restriction: Bathing    Bathing Restrictions: Other    Explain Bathing Restrictions: No soaking in bathtub for 4 weeks/ Showers are fine.    Driving Restrictions      Type of Restriction: Driving    Driving Restrictions: No Driving (Time Limited)    Length: Other    Indicate Length of Restriction: No driving for 1 week or while on narcotic pain medications. You must be able to quickly press on the brake before driving. Riding is car is fine.    Lifting Restrictions      Type of Restriction: Lifting    Lifting Restrictions: Other    Explain Lifing Restrictions: No lifting more than infant and baby carrier together for 6 weeks.    Pelvic Rest      Nothing in the vagina for 6 weeks to include tampons, douching, or sexual intercourse.    Sexual Activity Restrictions      Type of Restriction: Sex    Explain Sexual Activity Restrictions: No sexual intercourse, tampon use, or douching for at least 6 weeks        Regular   Discharge Medications:    Your medication list      START taking these medications      Instructions Last Dose Given Next Dose Due   acetaminophen 500 MG tablet  Commonly known as: TYLENOL      Take 2 tablets by mouth Every 6 (Six) Hours.       docusate sodium 100 MG capsule      Take 1 capsule by mouth 2 (Two) Times a Day As Needed for Constipation.       oxyCODONE 5 MG immediate release tablet  Commonly known as: ROXICODONE      Take 1 tablet by mouth Every 4 (Four) Hours As Needed for Severe Pain  for up to 8 days.          CONTINUE taking these medications      Instructions Last Dose Given Next Dose Due   albuterol sulfate  (90 Base) MCG/ACT inhaler  Commonly known as: ProAir HFA      Inhale 2 puffs Every 6 (Six) Hours As Needed for Wheezing.       famotidine 20 MG tablet  Commonly known as: Pepcid      Take 1 tablet by mouth 2 (Two) Times a Day.       montelukast 10 MG  tablet  Commonly known as: SINGULAIR      Take 1 tablet by mouth Daily.       ondansetron 4 MG tablet  Commonly known as: Zofran      Take 1 tablet by mouth Daily As Needed for Nausea or Vomiting.       Prenatal Adult Gummy/DHA/FA 0.4-25 MG chewable tablet      Chew.       Proferrin-Forte 12-1 MG tablet  Generic drug: Fe Heme Polypeptide-Folic Acid      Take 1 tablet by mouth 3 (Three) Times a Day.       Tums 500 MG chewable tablet  Generic drug: calcium carbonate      Chew 1 tablet As Needed.             Where to Get Your Medications      These medications were sent to [facility]/pharmacy #[id number] - [place], [place] - [address] [place] [address] - [phone]  - [phone] [address] [address]    Phone: [phone] ·   acetaminophen 500 MG tablet  · docusate sodium 100 MG capsule  · oxyCODONE 5 MG immediate release tablet        Discharge Disposition: Home   Follow-up: No future appointments.  2 week PP visit  6 week PP visit     This document has been electronically signed by Fernanda Hancock MD on November 22, 2020 10:06 CST.

## 2020-11-22 NOTE — DISCHARGE INSTRUCTIONS
Call for temp above 100.5, lumps or red streaks in breast, drainage redness or gapping at incision, increased pain or leg cramps.

## 2020-11-22 NOTE — PROGRESS NOTES
"[facility]  Progress Note - Obstetrics    Name: Anayeli Fuentes  MRN: [id number]  Location: M753/1  Date: 2020  CSN: [id number]    POD #2 s/p RLTCS at 37w3d secondary to IUGR, EBL 400cc    Patient seen and examined.  No complaints.  Pain well controlled.  Tolerating diet.  No nausea or vomiting.  No headache, dizziness, chest pain, or shortness of breath.  Passing flatus, denies bowel movement.  Up and out of bed and ambulating.  Voiding without difficulty.  Lochia minimal.  Breastfeeding.  Baby doing well.    PHYSICAL EXAM  /57 (BP Location: Right arm, Patient Position: Sitting)   Pulse 75   Temp 98.5 °F (36.9 °C) (Oral)   Resp 20   Ht 177.8 cm (70\")   Wt 73.9 kg (163 lb)   LMP 2020 (Exact Date)   SpO2 98%   Breastfeeding Yes   BMI 23.39 kg/m²   General: No apparent distress.  Alert and cooperative.  Pleasant.  Heart: Regular rate and rhythm.  No murmurs, rubs, or gallops.  Lungs: Clear to auscultation bilaterally.  No wheezes, rales, or rhonchi.  Abdomen: Soft.  Nontender to palpation.  No rebound tenderness or guarding.  +BS.  Incision: Clean, dry, and intact.  No erythema or warmth.   Extremities: +2/4 posterior tibial.  No pitting edema in lower extremities.  No calf tenderness.  Uterus: Uterine fundus firm, non-tender and below umbilicus.    Intake/Output Summary (Last 24 hours) at 2020 1005  Last data filed at 2020 0230  Gross per 24 hour   Intake 600 ml   Output --   Net 600 ml     LABS  Hgb: 13.5 -> 11.9    IMPRESSION  Anayeli Fuentes is a 40 y.o.  POD #2 s/p RLTCS at 37w3d secondary to IUGR.  Doing well and recovering appropriately.  Stable for d/c.    PLAN  1.  Following surgery  - Continue routine post op care  - Encourage OOB and ambulation  - Encourage incentive spirometry  - Diet: Pregnancy/breastfeeding  - DVT prophylaxis: SCDs  - Contraception: Mirena IUD  - Breastfeeding  - Discharge patient home today.  2 week, 6 week " follow-up for postpartum.    This document has been electronically signed by Fernanda Hancock MD on November 22, 2020 10:05 CST.

## 2020-11-23 ENCOUNTER — TELEPHONE (OUTPATIENT)
Dept: OBSTETRICS AND GYNECOLOGY | Facility: HOSPITAL | Age: 40
End: 2020-11-23

## 2020-11-23 NOTE — TELEPHONE ENCOUNTER
Lactation follow up call. Mother reports that infant gained 2 oz since he left the hospital. No questions or concerns at this time. Mother states bf is going very well.

## 2020-11-24 LAB
LAB AP CASE REPORT: NORMAL
PATH REPORT.FINAL DX SPEC: NORMAL

## 2020-12-04 ENCOUNTER — POSTPARTUM VISIT (OUTPATIENT)
Dept: OBSTETRICS AND GYNECOLOGY | Facility: CLINIC | Age: 40
End: 2020-12-04

## 2020-12-04 VITALS — BODY MASS INDEX: 20.81 KG/M2 | DIASTOLIC BLOOD PRESSURE: 72 MMHG | SYSTOLIC BLOOD PRESSURE: 104 MMHG | WEIGHT: 145 LBS

## 2020-12-04 PROCEDURE — 0503F POSTPARTUM CARE VISIT: CPT | Performed by: OBSTETRICS & GYNECOLOGY

## 2020-12-04 NOTE — PROGRESS NOTES
Postpartum visit      Anayeli Fuentes is a 40 y.o.  s/p , Low Transverse on  at 37w3d secondary to IUGR (AC 4%ile) who presents today for postpartum check.  The patient states that she is doing very well and doing better than she did after her last .  Patient denies postpartum depression.  Menstrual cycles have not resumed.  Breastfeeding.  Desires Mirena IUD for contraception.  She has resumed sexual intercourse.  Denies bowel or bladder issues.    She is pretty sure that she had an allergic reaction the Chlorprep.  She states that this happened last time but is better this time because we gave steroids while in the hospital.    PHYSICAL EXAM:    /72   Wt 65.8 kg (145 lb)   LMP 2020 (Exact Date)   BMI 20.81 kg/m²   Abdomen: +BS, benign, no masses, soft, non-tender.  Incision: Well-healed, clean, dry, intact.  Extremities: No deep calf tenderness.  Postpartum Depression Screening Questionnaire: 0, no treatment indicated.    IMPRESSION/PLAN:  40 y.o.  s/p term , Low Transverse on .  Doing well.   - Recovered nicely from her delivery  - Contraception: Mirena IUD  - RTC 4 wks for PP visit w/ Mirena IUD insertion    This document has been electronically signed by Fernanda Hancock MD on 2020 14:50 CST.

## 2020-12-08 ENCOUNTER — ANESTHESIA (OUTPATIENT)
Dept: LABOR AND DELIVERY | Facility: HOSPITAL | Age: 40
End: 2020-12-08

## 2021-01-04 ENCOUNTER — POSTPARTUM VISIT (OUTPATIENT)
Dept: OBSTETRICS AND GYNECOLOGY | Facility: CLINIC | Age: 41
End: 2021-01-04

## 2021-01-04 VITALS
DIASTOLIC BLOOD PRESSURE: 60 MMHG | BODY MASS INDEX: 21.62 KG/M2 | HEIGHT: 69 IN | SYSTOLIC BLOOD PRESSURE: 100 MMHG | WEIGHT: 146 LBS

## 2021-01-04 DIAGNOSIS — Z30.430 ENCOUNTER FOR IUD INSERTION: ICD-10-CM

## 2021-01-04 DIAGNOSIS — Z12.4 ENCOUNTER FOR PAPANICOLAOU SMEAR FOR CERVICAL CANCER SCREENING: ICD-10-CM

## 2021-01-04 PROBLEM — O34.219 PREVIOUS CESAREAN DELIVERY AFFECTING PREGNANCY: Status: RESOLVED | Noted: 2020-05-07 | Resolved: 2021-01-04

## 2021-01-04 PROBLEM — Z98.84 HISTORY OF GASTRIC BYPASS: Status: RESOLVED | Noted: 2020-05-07 | Resolved: 2021-01-04

## 2021-01-04 PROBLEM — O36.5930 POOR FETAL GROWTH AFFECTING MANAGEMENT OF MOTHER IN THIRD TRIMESTER: Status: RESOLVED | Noted: 2020-11-20 | Resolved: 2021-01-04

## 2021-01-04 PROBLEM — O99.013 ANEMIA DURING PREGNANCY IN THIRD TRIMESTER: Status: RESOLVED | Noted: 2020-10-16 | Resolved: 2021-01-04

## 2021-01-04 PROBLEM — O09.523 MULTIGRAVIDA OF ADVANCED MATERNAL AGE IN THIRD TRIMESTER: Status: RESOLVED | Noted: 2020-05-07 | Resolved: 2021-01-04

## 2021-01-04 PROBLEM — O99.019 ANTEPARTUM ANEMIA: Status: RESOLVED | Noted: 2020-09-21 | Resolved: 2021-01-04

## 2021-01-04 PROBLEM — O09.93 SUPERVISION OF HIGH RISK PREGNANCY IN THIRD TRIMESTER: Status: RESOLVED | Noted: 2020-05-07 | Resolved: 2021-01-04

## 2021-01-04 PROCEDURE — 0503F POSTPARTUM CARE VISIT: CPT | Performed by: OBSTETRICS & GYNECOLOGY

## 2021-01-04 PROCEDURE — 58300 INSERT INTRAUTERINE DEVICE: CPT | Performed by: OBSTETRICS & GYNECOLOGY

## 2021-01-04 NOTE — PROGRESS NOTES
[facility]  Gynecology  Procedure Note: IUD Insertion    Patient's last menstrual period was 03/03/2020 (exact date).    Date of procedure:  1/4/2021    Risks and benefits discussed? yes  All questions answered? yes  Consents given by the patient  Written consent obtained? yes    Local anesthesia used:  no    Procedure documentation:    A speculum was placed in order to view the cervix.  The cervix was cleansed with an antiseptic solution.  The anterior lip of the cervix was grasped with a tenaculum and the uterine cavity was gently sounded.  There was no difficulty passing the sound through the cervix.  Cervical dilation did not need to be performed prior to pacing the IUD.  The uterus was anteverted and sounded to 7 cms.  The Mirena was then prepared per the manufacturers instructions.    The Mirena was advanced to a point 2 cms from the fundus and then the arms were released from the sheath.  The device was advanced to the fundus and the device was released fully from the sheath.. The string was cut 3 cms in length.  Bleeding from the cervix was scant.    She tolerated the procedure without any difficulty.     Mirena IUD information  Mirena NDC# [id number]  Lot #: [id number]  Expiration date: 1/2023    Post procedure instructions:  Call if any fever or excessive bleeding or pain.    Follow up needed:  8 weeks string check    Fernanda Hancock MD  1/4/2021  16:44 CST

## 2021-01-04 NOTE — PROGRESS NOTES
"Postpartum visit      Anayeli Fuentes is a 40 y.o.  s/p , Low Transverse on  at 37w3d secondary to IUGR (AC 4%ile) who presents today for postpartum check.  The patient states that she is doing very well and doing better than she did after her last .  Patient denies postpartum depression.  Menstrual cycles have not resumed.  Breastfeeding.  Desires Mirena IUD for contraception.  She has resumed sexual intercourse.  Denies bowel or bladder issues.    PHYSICAL EXAM:    /60   Ht 175.3 cm (69\")   Wt 66.2 kg (146 lb)   LMP 2020 (Exact Date)   Breastfeeding Yes   BMI 21.56 kg/m²   Abdomen: +BS, benign, no masses, soft, non-tender.  Incision: Well-healed, clean, dry, intact.  Extremities: No deep calf tenderness.  Postpartum Depression Screening Questionnaire: 0, no treatment indicated.    IMPRESSION/PLAN:  40 y.o.  s/p term , Low Transverse on .  Doing well.   - Recovered nicely from her delivery  - Contraception: Mirena IUD, see procedure note  - RTC 8 wks for IUD string check    This document has been electronically signed by Fernanda Hancock MD on 2021 16:43 CST.  "

## 2021-01-07 LAB
LAB AP CASE REPORT: NORMAL
PATH INTERP SPEC-IMP: NORMAL

## 2021-01-21 ENCOUNTER — IMMUNIZATION (OUTPATIENT)
Dept: VACCINE CLINIC | Facility: HOSPITAL | Age: 41
End: 2021-01-21

## 2021-01-21 PROCEDURE — 0001A: CPT | Performed by: THORACIC SURGERY (CARDIOTHORACIC VASCULAR SURGERY)

## 2021-01-21 PROCEDURE — 91300 HC SARSCOV02 VAC 30MCG/0.3ML IM: CPT | Performed by: THORACIC SURGERY (CARDIOTHORACIC VASCULAR SURGERY)

## 2021-02-11 ENCOUNTER — IMMUNIZATION (OUTPATIENT)
Dept: VACCINE CLINIC | Facility: HOSPITAL | Age: 41
End: 2021-02-11

## 2021-02-11 PROCEDURE — 0002A: CPT | Performed by: THORACIC SURGERY (CARDIOTHORACIC VASCULAR SURGERY)

## 2021-02-11 PROCEDURE — 91300 HC SARSCOV02 VAC 30MCG/0.3ML IM: CPT | Performed by: THORACIC SURGERY (CARDIOTHORACIC VASCULAR SURGERY)

## 2021-09-08 ENCOUNTER — TELEPHONE (OUTPATIENT)
Dept: OBSTETRICS AND GYNECOLOGY | Facility: HOSPITAL | Age: 41
End: 2021-09-08

## 2021-09-17 RX ORDER — IRON HEME POLYPEPTIDE/FOLIC AC 12-1MG
1 TABLET ORAL 3 TIMES DAILY
Qty: 90 EACH | Refills: 10 | Status: SHIPPED | OUTPATIENT
Start: 2021-09-17

## 2022-09-09 NOTE — ANESTHESIA PREPROCEDURE EVALUATION
Anesthesia Evaluation     Patient summary reviewed   NPO Solid Status: > 8 hours  NPO Liquid Status: > 8 hours           Airway   Mallampati: II  TM distance: >3 FB  Neck ROM: full  No difficulty expected  Dental - normal exam     Pulmonary - normal exam   (+) asthma,  Cardiovascular - normal exam        Neuro/Psych  GI/Hepatic/Renal/Endo    (+)   liver disease fatty liver disease, renal disease stones,     Musculoskeletal     Abdominal  - normal exam   Substance History      OB/GYN    (+) Pregnant,         Other                        Anesthesia Plan    ASA 2     spinal       Anesthetic plan, all risks, benefits, and alternatives have been provided, discussed and informed consent has been obtained with: patient and spouse/significant other.    Plan discussed with CRNA.      
20

## 2022-12-20 DIAGNOSIS — Z12.31 ENCOUNTER FOR SCREENING MAMMOGRAM FOR MALIGNANT NEOPLASM OF BREAST: Primary | ICD-10-CM

## 2022-12-30 DIAGNOSIS — Z12.31 ENCOUNTER FOR SCREENING MAMMOGRAM FOR MALIGNANT NEOPLASM OF BREAST: ICD-10-CM

## 2023-01-03 DIAGNOSIS — R92.8 ABNORMALITY OF RIGHT BREAST ON SCREENING MAMMOGRAM: Primary | ICD-10-CM

## (undated) DEVICE — SUT VIC 0 CTX 36IN J978H

## (undated) DEVICE — SYS SKIN CLS DERMABOND PRINEO W/22CM MESH TP

## (undated) DEVICE — GLV SURG SENSICARE PI LF PF 7.5 GRN STRL

## (undated) DEVICE — PK C/SECT 60

## (undated) DEVICE — TRY SPINE PENCAN 24GA X4IN

## (undated) DEVICE — GOWN,PREVENTION PLUS,XLARGE,STERILE: Brand: MEDLINE

## (undated) DEVICE — GLV SURG TRIUMPH ORTHO W/ALOE PF LTX 8 STRL

## (undated) DEVICE — SUT VICRYL 3-0 SH-1 PO 18IN J772D

## (undated) DEVICE — GLV SURG SENSICARE GREEN W/ALOE PF LF 6.5 STRL

## (undated) DEVICE — GLV SURG TRIUMPH ORTHO W/ALOE PF LTX 6.5 STRL

## (undated) DEVICE — Device

## (undated) DEVICE — SUT MNCRYL 3/0 Y936H

## (undated) DEVICE — SUT PDS LP 0 CTX VIO 60IN Z990G

## (undated) DEVICE — SUT PERMAHAND SILK 3/0 SH1 18IN

## (undated) DEVICE — GARMENT,MEDLINE,DVT,INT,CALF,MED, GEN2: Brand: MEDLINE

## (undated) DEVICE — SUT VIC 0 CT1 36IN J946H

## (undated) DEVICE — SUT  GUT PLAIN 2/0 CT1 27IN 843H

## (undated) DEVICE — SUT VIC 3/0 CTI 36IN J944H

## (undated) DEVICE — GOWN,PREVENTION PLUS,XLNG/XXLARGE,STRL: Brand: MEDLINE

## (undated) DEVICE — SUT VIC 2/0 CT1 36IN